# Patient Record
Sex: MALE | Race: WHITE | NOT HISPANIC OR LATINO | Employment: FULL TIME | URBAN - METROPOLITAN AREA
[De-identification: names, ages, dates, MRNs, and addresses within clinical notes are randomized per-mention and may not be internally consistent; named-entity substitution may affect disease eponyms.]

---

## 2023-06-02 LAB
ALANINE AMINOTRANSFERASE (SGPT) (U/L) IN SER/PLAS: 21 U/L (ref 10–52)
ALBUMIN (G/DL) IN SER/PLAS: 4.4 G/DL (ref 3.4–5)
ALKALINE PHOSPHATASE (U/L) IN SER/PLAS: 66 U/L (ref 33–120)
ANION GAP IN SER/PLAS: 10 MMOL/L (ref 10–20)
ASPARTATE AMINOTRANSFERASE (SGOT) (U/L) IN SER/PLAS: 17 U/L (ref 9–39)
BASOPHILS (10*3/UL) IN BLOOD BY AUTOMATED COUNT: 0.05 X10E9/L (ref 0–0.1)
BASOPHILS/100 LEUKOCYTES IN BLOOD BY AUTOMATED COUNT: 0.9 % (ref 0–2)
BILIRUBIN TOTAL (MG/DL) IN SER/PLAS: 0.7 MG/DL (ref 0–1.2)
C REACTIVE PROTEIN (MG/L) IN SER/PLAS: 0.32 MG/DL
CALCIDIOL (25 OH VITAMIN D3) (NG/ML) IN SER/PLAS: 58 NG/ML
CALCIUM (MG/DL) IN SER/PLAS: 9.3 MG/DL (ref 8.6–10.3)
CARBON DIOXIDE, TOTAL (MMOL/L) IN SER/PLAS: 27 MMOL/L (ref 21–32)
CHLORIDE (MMOL/L) IN SER/PLAS: 103 MMOL/L (ref 98–107)
CHOLESTEROL (MG/DL) IN SER/PLAS: 145 MG/DL (ref 0–199)
CHOLESTEROL IN HDL (MG/DL) IN SER/PLAS: 43.6 MG/DL
CHOLESTEROL/HDL RATIO: 3.3
COBALAMIN (VITAMIN B12) (PG/ML) IN SER/PLAS: 184 PG/ML (ref 211–911)
CREATINE KINASE (U/L) IN SER/PLAS: 160 U/L (ref 0–325)
CREATININE (MG/DL) IN SER/PLAS: 1.43 MG/DL (ref 0.5–1.3)
EOSINOPHILS (10*3/UL) IN BLOOD BY AUTOMATED COUNT: 0.42 X10E9/L (ref 0–0.7)
EOSINOPHILS/100 LEUKOCYTES IN BLOOD BY AUTOMATED COUNT: 7.6 % (ref 0–6)
ERYTHROCYTE DISTRIBUTION WIDTH (RATIO) BY AUTOMATED COUNT: 13.3 % (ref 11.5–14.5)
ERYTHROCYTE MEAN CORPUSCULAR HEMOGLOBIN CONCENTRATION (G/DL) BY AUTOMATED: 33.8 G/DL (ref 32–36)
ERYTHROCYTE MEAN CORPUSCULAR VOLUME (FL) BY AUTOMATED COUNT: 88 FL (ref 80–100)
ERYTHROCYTES (10*6/UL) IN BLOOD BY AUTOMATED COUNT: 5.22 X10E12/L (ref 4.5–5.9)
GFR MALE: 57 ML/MIN/1.73M2
GLUCOSE (MG/DL) IN SER/PLAS: 102 MG/DL (ref 74–99)
HEMATOCRIT (%) IN BLOOD BY AUTOMATED COUNT: 45.9 % (ref 41–52)
HEMOGLOBIN (G/DL) IN BLOOD: 15.5 G/DL (ref 13.5–17.5)
IMMATURE GRANULOCYTES/100 LEUKOCYTES IN BLOOD BY AUTOMATED COUNT: 0.4 % (ref 0–0.9)
LDL: 79 MG/DL (ref 0–99)
LEUKOCYTES (10*3/UL) IN BLOOD BY AUTOMATED COUNT: 5.5 X10E9/L (ref 4.4–11.3)
LYMPHOCYTES (10*3/UL) IN BLOOD BY AUTOMATED COUNT: 1.68 X10E9/L (ref 1.2–4.8)
LYMPHOCYTES/100 LEUKOCYTES IN BLOOD BY AUTOMATED COUNT: 30.5 % (ref 13–44)
MONOCYTES (10*3/UL) IN BLOOD BY AUTOMATED COUNT: 0.52 X10E9/L (ref 0.1–1)
MONOCYTES/100 LEUKOCYTES IN BLOOD BY AUTOMATED COUNT: 9.5 % (ref 2–10)
NEUTROPHILS (10*3/UL) IN BLOOD BY AUTOMATED COUNT: 2.81 X10E9/L (ref 1.2–7.7)
NEUTROPHILS/100 LEUKOCYTES IN BLOOD BY AUTOMATED COUNT: 51.1 % (ref 40–80)
PLATELETS (10*3/UL) IN BLOOD AUTOMATED COUNT: 199 X10E9/L (ref 150–450)
POTASSIUM (MMOL/L) IN SER/PLAS: 4.2 MMOL/L (ref 3.5–5.3)
PROTEIN TOTAL: 7 G/DL (ref 6.4–8.2)
SEDIMENTATION RATE, ERYTHROCYTE: 6 MM/H (ref 0–20)
SODIUM (MMOL/L) IN SER/PLAS: 136 MMOL/L (ref 136–145)
THYROTROPIN (MIU/L) IN SER/PLAS BY DETECTION LIMIT <= 0.05 MIU/L: 0.97 MIU/L (ref 0.44–3.98)
TRIGLYCERIDE (MG/DL) IN SER/PLAS: 113 MG/DL (ref 0–149)
URATE (MG/DL) IN SER/PLAS: 5.5 MG/DL (ref 4–7.5)
UREA NITROGEN (MG/DL) IN SER/PLAS: 18 MG/DL (ref 6–23)
VLDL: 23 MG/DL (ref 0–40)

## 2023-06-05 LAB
ANA PATTERN: ABNORMAL
ANA TITER: ABNORMAL
ANTI-CENTROMERE: <0.2 AI
ANTI-CHROMATIN: <0.2 AI
ANTI-DNA (DS): 24 IU/ML
ANTI-JO-1 IGG: <0.2 AI
ANTI-NUCLEAR ANTIBODY (ANA): POSITIVE
ANTI-RIBOSOMAL P: <0.2 AI
ANTI-RNP: <0.2 AI
ANTI-SCL-70: <0.2 AI
ANTI-SM/RNP: <0.2 AI
ANTI-SM: <0.2 AI
ANTI-SSA: <0.2 AI
ANTI-SSB: <0.2 AI

## 2023-06-06 LAB
ALBUMIN ELP: 4.3 G/DL (ref 3.4–5)
ALPHA 1: 0.3 G/DL (ref 0.2–0.6)
ALPHA 2: 0.7 G/DL (ref 0.4–1.1)
BETA: 0.7 G/DL (ref 0.5–1.2)
GAMMA GLOBULIN: 1 G/DL (ref 0.5–1.4)
PATH REVIEW - SERUM IMMUNOFIXATION: NORMAL
PATH REVIEW-SERUM PROTEIN ELECTROPHORESIS: NORMAL
PROTEIN ELECTROPHORESIS INTERPRETATION: NORMAL
PROTEIN TOTAL: 7 G/DL (ref 6.4–8.2)
SERUM IMMUNOFIXATION INTERPRETATION: NORMAL

## 2023-06-09 LAB
APPEARANCE, URINE: CLEAR
BILIRUBIN, URINE: NEGATIVE
BLOOD, URINE: NEGATIVE
COLOR, URINE: YELLOW
COMPLEMENT C3 (MG/DL) IN SER/PLAS: 127 MG/DL (ref 87–200)
COMPLEMENT C4 (MG/DL) IN SER/PLAS: 25 MG/DL (ref 10–50)
GLUCOSE, URINE: 100 MG/DL
KETONES, URINE: NEGATIVE MG/DL
LEUKOCYTE ESTERASE, URINE: NEGATIVE
NITRITE, URINE: NEGATIVE
PH, URINE: 5 (ref 5–8)
PROTEIN, URINE: NEGATIVE MG/DL
SPECIFIC GRAVITY, URINE: 1.02 (ref 1–1.03)
UROBILINOGEN, URINE: <2 MG/DL (ref 0–1.9)

## 2023-06-13 LAB — ANTI-HISTONE ANTIBODIES: 0.5 UNITS (ref 0–0.9)

## 2023-07-28 LAB
ALANINE AMINOTRANSFERASE (SGPT) (U/L) IN SER/PLAS: 21 U/L (ref 10–52)
ALBUMIN (G/DL) IN SER/PLAS: 4.2 G/DL (ref 3.4–5)
ALKALINE PHOSPHATASE (U/L) IN SER/PLAS: 62 U/L (ref 33–120)
ASPARTATE AMINOTRANSFERASE (SGOT) (U/L) IN SER/PLAS: 16 U/L (ref 9–39)
BILIRUBIN DIRECT (MG/DL) IN SER/PLAS: 0.1 MG/DL (ref 0–0.3)
BILIRUBIN TOTAL (MG/DL) IN SER/PLAS: 0.4 MG/DL (ref 0–1.2)
CREATINE KINASE (U/L) IN SER/PLAS: 185 U/L (ref 0–325)
CREATININE (MG/DL) IN SER/PLAS: 1.47 MG/DL (ref 0.5–1.3)
GFR MALE: 55 ML/MIN/1.73M2
PROTEIN TOTAL: 6.7 G/DL (ref 6.4–8.2)
UREA NITROGEN (MG/DL) IN SER/PLAS: 21 MG/DL (ref 6–23)

## 2023-07-29 LAB — ANTI-DNA (DS): 18 IU/ML

## 2023-07-31 LAB — ALDOLASE SERUM: 5.5 U/L (ref 1.2–7.6)

## 2023-10-09 PROBLEM — R06.83 SNORING: Status: ACTIVE | Noted: 2023-10-09

## 2023-10-09 PROBLEM — M72.2 PLANTAR FASCIAL FIBROMATOSIS OF LEFT FOOT: Status: ACTIVE | Noted: 2023-10-09

## 2023-10-09 PROBLEM — Z22.7 LATENT TUBERCULOSIS INFECTION: Status: ACTIVE | Noted: 2023-10-09

## 2023-10-09 PROBLEM — D22.9 NEVUS, ATYPICAL: Status: ACTIVE | Noted: 2023-10-09

## 2023-10-09 PROBLEM — H72.92 TYMPANIC MEMBRANE PERFORATION, LEFT: Status: ACTIVE | Noted: 2023-10-09

## 2023-10-09 PROBLEM — E78.5 HYPERLIPIDEMIA: Status: ACTIVE | Noted: 2023-10-09

## 2023-10-09 PROBLEM — H67.3: Status: ACTIVE | Noted: 2023-10-09

## 2023-10-09 PROBLEM — H66.93 BILATERAL CHRONIC OTITIS MEDIA: Status: ACTIVE | Noted: 2023-10-09

## 2023-10-09 PROBLEM — L02.211 ABDOMINAL WALL ABSCESS: Status: ACTIVE | Noted: 2023-10-09

## 2023-10-09 PROBLEM — K50.90: Status: ACTIVE | Noted: 2023-10-09

## 2023-10-09 PROBLEM — G62.9 PERIPHERAL NEUROPATHY: Status: ACTIVE | Noted: 2023-10-09

## 2023-10-09 PROBLEM — H72.93: Status: ACTIVE | Noted: 2023-10-09

## 2023-10-09 PROBLEM — E53.8 VITAMIN B12 DEFICIENCY: Status: ACTIVE | Noted: 2023-10-09

## 2023-10-09 PROBLEM — M79.671 MECHANICAL PAIN OF RIGHT FOOT: Status: ACTIVE | Noted: 2023-10-09

## 2023-10-09 PROBLEM — H90.A32 MIXED CONDUCTIVE AND SENSORINEURAL HEARING LOSS, UNILATERAL, LEFT EAR WITH RESTRICTED HEARING ON THE CONTRALATERAL SIDE: Status: ACTIVE | Noted: 2023-10-09

## 2023-10-09 PROBLEM — H93.8X3 SENSATION OF FULLNESS IN BOTH EARS: Status: ACTIVE | Noted: 2023-10-09

## 2023-10-09 PROBLEM — M10.9 GOUT: Status: ACTIVE | Noted: 2023-10-09

## 2023-10-09 PROBLEM — H90.6 MIXED CONDUCTIVE AND SENSORINEURAL HEARING LOSS OF BOTH EARS: Status: ACTIVE | Noted: 2023-10-09

## 2023-10-09 PROBLEM — E55.9 VITAMIN D DEFICIENCY: Status: ACTIVE | Noted: 2023-10-09

## 2023-10-09 PROBLEM — R53.83 FATIGUE: Status: ACTIVE | Noted: 2023-10-09

## 2023-10-09 PROBLEM — M19.079 OSTEOARTHRITIS OF FOOT JOINT: Status: ACTIVE | Noted: 2023-10-09

## 2023-10-09 PROBLEM — M79.10 MYALGIA: Status: ACTIVE | Noted: 2023-10-09

## 2023-10-09 PROBLEM — M72.2 PLANTAR FASCIITIS, LEFT: Status: ACTIVE | Noted: 2023-10-09

## 2023-10-09 PROBLEM — H90.5 SENSORINEURAL HEARING LOSS (SNHL) OF LEFT EAR: Status: ACTIVE | Noted: 2023-10-09

## 2023-10-09 PROBLEM — M07.60: Status: ACTIVE | Noted: 2023-10-09

## 2023-10-09 PROBLEM — K40.90 INGUINAL HERNIA: Status: ACTIVE | Noted: 2023-10-09

## 2023-10-09 PROBLEM — H93.19 TINNITUS, SUBJECTIVE: Status: ACTIVE | Noted: 2023-10-09

## 2023-10-09 PROBLEM — G47.30 SLEEP APNEA: Status: ACTIVE | Noted: 2023-10-09

## 2023-10-09 PROBLEM — R22.30 MASS OF SHOULDER REGION: Status: ACTIVE | Noted: 2023-10-09

## 2023-10-09 PROBLEM — S93.313A: Status: ACTIVE | Noted: 2023-10-09

## 2023-10-09 PROBLEM — L91.8 SKIN TAG, ACQUIRED: Status: ACTIVE | Noted: 2023-10-09

## 2023-10-09 PROBLEM — K58.9 IRRITABLE BOWEL SYNDROME: Status: ACTIVE | Noted: 2023-10-09

## 2023-10-09 PROBLEM — H66.3X1 CHRONIC SUPPURATIVE OTITIS MEDIA OF RIGHT EAR: Status: ACTIVE | Noted: 2023-10-09

## 2023-10-09 PROBLEM — N18.31 STAGE 3A CHRONIC KIDNEY DISEASE (MULTI): Status: ACTIVE | Noted: 2023-10-09

## 2023-10-09 PROBLEM — E66.9 OBESITY: Status: ACTIVE | Noted: 2023-10-09

## 2023-10-09 PROBLEM — R14.0 ABDOMINAL BLOATING: Status: ACTIVE | Noted: 2023-10-09

## 2023-10-09 PROBLEM — H90.A21 SENSORINEURAL HEARING LOSS (SNHL) OF RIGHT EAR WITH RESTRICTED HEARING OF LEFT EAR: Status: ACTIVE | Noted: 2023-10-09

## 2023-10-09 PROBLEM — H72.90: Status: ACTIVE | Noted: 2023-10-09

## 2023-10-09 PROBLEM — R73.9 HYPERGLYCEMIA: Status: ACTIVE | Noted: 2023-10-09

## 2023-10-09 PROBLEM — M76.72 PERONEAL TENDINITIS OF LEFT LOWER EXTREMITY: Status: ACTIVE | Noted: 2023-10-09

## 2023-10-09 PROBLEM — H90.0 CONDUCTIVE HEARING LOSS, BILATERAL: Status: ACTIVE | Noted: 2023-10-09

## 2023-10-09 PROBLEM — I10 HYPERTENSION: Status: ACTIVE | Noted: 2023-10-09

## 2023-10-09 PROBLEM — K50.10 CROHN'S DISEASE OF COLON (MULTI): Status: ACTIVE | Noted: 2023-10-09

## 2023-10-09 PROBLEM — H61.21 HEARING LOSS OF RIGHT EAR DUE TO CERUMEN IMPACTION: Status: ACTIVE | Noted: 2023-10-09

## 2023-10-09 PROBLEM — H70.13 CHRONIC MASTOIDITIS OF BOTH SIDES: Status: ACTIVE | Noted: 2023-10-09

## 2023-10-09 PROBLEM — F17.200 CURRENT EVERY DAY SMOKER: Status: ACTIVE | Noted: 2023-10-09

## 2023-10-09 PROBLEM — R76.8 POSITIVE ANA (ANTINUCLEAR ANTIBODY): Status: ACTIVE | Noted: 2023-10-09

## 2023-10-09 RX ORDER — ATORVASTATIN CALCIUM 20 MG/1
1 TABLET, FILM COATED ORAL NIGHTLY
COMMUNITY
Start: 2014-07-11 | End: 2023-11-17 | Stop reason: SINTOL

## 2023-10-09 RX ORDER — AMLODIPINE AND VALSARTAN 5; 160 MG/1; MG/1
1 TABLET ORAL DAILY
COMMUNITY
End: 2024-01-03 | Stop reason: ALTCHOICE

## 2023-10-09 RX ORDER — METOPROLOL SUCCINATE 100 MG/1
1 TABLET, EXTENDED RELEASE ORAL DAILY
COMMUNITY
Start: 2020-11-10 | End: 2024-01-03 | Stop reason: SDUPTHER

## 2023-10-09 RX ORDER — ERGOCALCIFEROL 1.25 MG/1
1 CAPSULE ORAL DAILY
COMMUNITY
Start: 2019-04-18

## 2023-10-09 RX ORDER — LANOLIN ALCOHOL/MO/W.PET/CERES
1 CREAM (GRAM) TOPICAL DAILY
COMMUNITY
End: 2024-01-31 | Stop reason: ALTCHOICE

## 2023-10-10 ENCOUNTER — OFFICE VISIT (OUTPATIENT)
Dept: PODIATRY | Facility: CLINIC | Age: 58
End: 2023-10-10
Payer: COMMERCIAL

## 2023-10-10 DIAGNOSIS — M72.2 PLANTAR FASCIITIS OF RIGHT FOOT: Primary | ICD-10-CM

## 2023-10-10 PROCEDURE — 20550 NJX 1 TENDON SHEATH/LIGAMENT: CPT | Performed by: PODIATRIST

## 2023-10-10 PROCEDURE — 99213 OFFICE O/P EST LOW 20 MIN: CPT | Performed by: PODIATRIST

## 2023-10-10 RX ORDER — LIDOCAINE HYDROCHLORIDE 10 MG/ML
2 INJECTION INFILTRATION; PERINEURAL
Status: COMPLETED | OUTPATIENT
Start: 2023-10-10 | End: 2023-10-10

## 2023-10-10 RX ORDER — TRIAMCINOLONE ACETONIDE 40 MG/ML
10 INJECTION, SUSPENSION INTRA-ARTICULAR; INTRAMUSCULAR
Status: COMPLETED | OUTPATIENT
Start: 2023-10-10 | End: 2023-10-10

## 2023-10-10 RX ADMIN — LIDOCAINE HYDROCHLORIDE 2 ML: 10 INJECTION INFILTRATION; PERINEURAL at 14:06

## 2023-10-10 RX ADMIN — TRIAMCINOLONE ACETONIDE 10 MG: 40 INJECTION, SUSPENSION INTRA-ARTICULAR; INTRAMUSCULAR at 14:06

## 2023-10-10 NOTE — PATIENT INSTRUCTIONS
We discussed the option of surgical repair.  Patient is to call me if he would like to proceed with surgery.

## 2023-10-10 NOTE — PROGRESS NOTES
CC: right heel pain    HPI:  This 58 y.o. male presents complaining of pain in the right heel x 3 months.  Had a shot 3 moths ago.Denies trauma. Onset was gradual with worsening course until recently.  Denies wt gain of > 10lbs in the last month and denies starting a new exercise program. Patient endorses burning, numbness or tingling. Pain is severe  and rated as 10 /10 after getting up in AM with no improvement after prolonged WB.  Denies any swelling or redness.  Patient is not using inserts.  And has  been taking analgesics with some relief.  Denies any other pedal complaints.    PMH  Past Medical History:   Diagnosis Date    Crohn's disease, unspecified, without complications (CMS/[facility]) 08/24/2020    Crohn's disease    Hemorrhage of anus and rectum     Hemorrhage of rectum and anus    Hyperlipidemia, unspecified     Dyslipidemia    Otitis media, unspecified, right ear 01/04/2018    Acute otitis media, right    Personal history of other diseases of the circulatory system     History of hypertension    Personal history of other diseases of the nervous system and sense organs     History of sleep apnea    Personal history of other diseases of the respiratory system 12/08/2018    History of upper respiratory infection    Personal history of other diseases of the respiratory system     History of bronchitis    Rash and other nonspecific skin eruption     Rash    Unspecified abdominal pain 08/24/2020    Abdominal cramping     MEDS    Current Outpatient Medications:     amlodipine-valsartan (Exforge) 5-160 mg tablet, Take 1 tablet by mouth once daily., Disp: , Rfl:     cyanocobalamin (Vitamin B-12) 1,000 mcg tablet, Take 1 tablet (1,000 mcg) by mouth once daily., Disp: , Rfl:     ergocalciferol (Vitamin D-2) 1.25 MG (39175 UT) capsule, Take 1 capsule (1,250 mcg) by mouth once daily., Disp: , Rfl:     metoprolol succinate XL (Toprol-XL) 100 mg 24 hr tablet, Take 1 tablet (100 mg) by mouth once daily., Disp: , Rfl:      Stelara injection, INJECT 90 MG SUBCUTANEOUSLY EVERY 8 WEEKS AS DIRECTED, Disp: 2 mL, Rfl: 2    atorvastatin (Lipitor) 20 mg tablet, Take 1 tablet (20 mg) by mouth once daily at bedtime., Disp: , Rfl:   Allergies  Allergies   Allergen Reactions    Atorvastatin Myalgia     Joint pain and fatigue    Cephalexin Hives     Social History     Socioeconomic History    Marital status:      Spouse name: Not on file    Number of children: Not on file    Years of education: Not on file    Highest education level: Not on file   Occupational History    Not on file   Tobacco Use    Smoking status: Every Day     Types: Cigarettes    Smokeless tobacco: Current   Substance and Sexual Activity    Alcohol use: Not on file    Drug use: Not on file    Sexual activity: Not on file   Other Topics Concern    Not on file   Social History Narrative    Not on file     Social Determinants of Health     Financial Resource Strain: Not on file   Food Insecurity: Not on file   Transportation Needs: Not on file   Physical Activity: Not on file   Stress: Not on file   Social Connections: Not on file   Intimate Partner Violence: Not on file   Housing Stability: Not on file     Family History   Problem Relation Name Age of Onset    Stroke Mother      Hypertension Mother      Breast cancer Other Grandmother     Uterine cancer Other Grandmother      Past Surgical History:   Procedure Laterality Date    COLONOSCOPY  08/24/2020    Complete Colonoscopy    HERNIA REPAIR  08/24/2020    Hernia Repair    OTHER SURGICAL HISTORY  08/24/2020    Carpal tunnel surgery    OTHER SURGICAL HISTORY  08/24/2020    Tonsillectomy    OTHER SURGICAL HISTORY  08/24/2020    Adenoidectomy    OTHER SURGICAL HISTORY  08/25/2022    Colon surgery    OTHER SURGICAL HISTORY  08/25/2022    Colonoscopy    OTHER SURGICAL HISTORY  08/25/2022    Hernia repair    OTHER SURGICAL HISTORY  08/25/2022    Small bowel resection    SMALL INTESTINE SURGERY  08/24/2020    Small Bowel  Resection       REVIEW OF SYSTEMS  MSK: + as noted in HPI.      Physical examination:   On General Observation: Patient is a pleasant, cooperative, well developed 58 y.o.  adult male. The patient is alert and oriented to time, place and person. Patient has normal affect and mood.  There were no vitals taken for this visit.    Vascular:  DP and PT pulses are palpable.  CFT less than 3 seconds to all digits bilateral.  Skin temperature is warm to warm from proximal to distal bilateral.  Hair growth is noted.      Neuro:  Light touch intact bilateral. Protective sensation intact at all pedal sites via [place] 5.07 monofilament bilateral.  Proprioception intact at the hallux bilateral.  No clonus noted.  Babinski reflex not elicited bilateral.    Derm:  Skin texture and turgor within normal limits.  Toenails normal in appearance.  Webspaces 1-4 clean, dry, intact bilateral.  No rashes, subcutaneous nodules, or open lesions noted.  No hyperkeratotic tissue. no erythema    Muscle strength is 5/5 for all instrinsic and extrinsic muscle groups.   General foot morphology:  ROM of the STJ and MTJ is jamie without pain or crepitus. Pain with palpation to the plantar medial aspect of the right heel at the insertion site of the central band of the plantar fascia.  Positive erythema and effusion to the plantar right  heel.  Negative Tinel's sign with percussion of the posterior tibial nerve at the proximal and distal tarsal tunnel regions.  No pain with palpation/compression to posterior calcaneal tuber.  No signs of calcaneal stress fracture.  No signs of Brumfield's nerve entrapment.  Negative straight leg raise test.  No evidence of nodularity or fibromatosis.        ASSESSMENT:  This is a 58 y.o. patient presenting with right plantar fasciitis     PLAN:   A comprehensive history and physical examination were preformed. The patient was educated on clinical and radiographic findings, diagnosis and treatment plans. Patient  state that he understands all that has been explained and all questions were answered to his apparent satisfaction.     -We discussed the etiology of the heel complaints as well as the plantar fasciitis treatment protocol.  -Pt to to begin stretching, icing,  and wearing appropriate shoes.  No barefoot walking.  -Demonstrated stretching exercises to the pt - dispensed literature.   -Recommended using OTC arch supports, discussed details with patient  -Advised patient on use of anti-inflammatory medications .   -Consider surgery if pain is not relieved by injection.  Patient ID: Colby Cordero III is a 58 y.o. male.    S Inj/Asp on 10/10/2023 2:06 PM  Indications: pain  Details: 25 G needle, plantar approach  Medications: 10 mg triamcinolone acetonide 40 mg/mL; 2 mL lidocaine 10 mg/mL (1 %)  Outcome: tolerated well, no immediate complications  Procedure, treatment alternatives, risks and benefits explained, specific risks discussed. Immediately prior to procedure a time out was called to verify the correct patient, procedure, equipment, support staff and site/side marked as required.           Libia Arroyo, CHELY  [address] [place] [address]

## 2023-10-20 ENCOUNTER — PREP FOR PROCEDURE (OUTPATIENT)
Dept: PODIATRY | Facility: CLINIC | Age: 58
End: 2023-10-20
Payer: COMMERCIAL

## 2023-10-20 DIAGNOSIS — M72.2 PLANTAR FASCIITIS OF RIGHT FOOT: Primary | ICD-10-CM

## 2023-11-17 ENCOUNTER — PRE-ADMISSION TESTING (OUTPATIENT)
Dept: PREADMISSION TESTING | Facility: HOSPITAL | Age: 58
End: 2023-11-17
Payer: COMMERCIAL

## 2023-11-17 ENCOUNTER — LAB (OUTPATIENT)
Dept: LAB | Facility: LAB | Age: 58
End: 2023-11-17
Payer: COMMERCIAL

## 2023-11-17 VITALS
DIASTOLIC BLOOD PRESSURE: 70 MMHG | HEART RATE: 60 BPM | SYSTOLIC BLOOD PRESSURE: 141 MMHG | RESPIRATION RATE: 12 BRPM | HEIGHT: 72 IN | WEIGHT: 265.65 LBS | TEMPERATURE: 96.6 F | BODY MASS INDEX: 35.98 KG/M2 | OXYGEN SATURATION: 97 %

## 2023-11-17 DIAGNOSIS — M72.2 PLANTAR FASCIITIS OF RIGHT FOOT: ICD-10-CM

## 2023-11-17 DIAGNOSIS — Z01.818 PRE-OP TESTING: ICD-10-CM

## 2023-11-17 DIAGNOSIS — Z01.818 PRE-OP TESTING: Primary | ICD-10-CM

## 2023-11-17 LAB
ANION GAP SERPL CALC-SCNC: 12 MMOL/L (ref 10–20)
BUN SERPL-MCNC: 15 MG/DL (ref 6–23)
CALCIUM SERPL-MCNC: 9.6 MG/DL (ref 8.6–10.3)
CHLORIDE SERPL-SCNC: 106 MMOL/L (ref 98–107)
CO2 SERPL-SCNC: 25 MMOL/L (ref 21–32)
CREAT SERPL-MCNC: 1.51 MG/DL (ref 0.5–1.3)
GFR SERPL CREATININE-BSD FRML MDRD: 53 ML/MIN/1.73M*2
GLUCOSE SERPL-MCNC: 83 MG/DL (ref 74–99)
POTASSIUM SERPL-SCNC: 4.1 MMOL/L (ref 3.5–5.3)
SODIUM SERPL-SCNC: 139 MMOL/L (ref 136–145)

## 2023-11-17 PROCEDURE — 80048 BASIC METABOLIC PNL TOTAL CA: CPT

## 2023-11-17 PROCEDURE — 36415 COLL VENOUS BLD VENIPUNCTURE: CPT

## 2023-11-17 PROCEDURE — 99202 OFFICE O/P NEW SF 15 MIN: CPT | Performed by: NURSE PRACTITIONER

## 2023-11-17 ASSESSMENT — DUKE ACTIVITY SCORE INDEX (DASI)
CAN YOU TAKE CARE OF YOURSELF (EAT, DRESS, BATHE, OR USE TOILET): YES
CAN YOU DO HEAVY WORK AROUND THE HOUSE LIKE SCRUBBING FLOORS OR LIFTING AND MOVING HEAVY FURNITURE: YES
CAN YOU DO MODERATE WORK AROUND THE HOUSE LIKE VACUUMING, SWEEPING FLOORS OR CARRYING GROCERIES: YES
DASI METS SCORE: 8.2
CAN YOU WALK INDOORS, SUCH AS AROUND YOUR HOUSE: YES
CAN YOU RUN A SHORT DISTANCE: YES
CAN YOU PARTICIPATE IN MODERATE RECREATIONAL ACTIVITIES LIKE GOLF, BOWLING, DANCING, DOUBLES TENNIS OR THROWING A BASEBALL OR FOOTBALL: NO
TOTAL_SCORE: 44.7
CAN YOU DO LIGHT WORK AROUND THE HOUSE LIKE DUSTING OR WASHING DISHES: YES
CAN YOU DO YARD WORK LIKE RAKING LEAVES, WEEDING OR PUSHING A MOWER: YES
CAN YOU PARTICIPATE IN STRENOUS SPORTS LIKE SWIMMING, SINGLES TENNIS, FOOTBALL, BASKETBALL, OR SKIING: NO
CAN YOU WALK A BLOCK OR TWO ON LEVEL GROUND: YES
CAN YOU CLIMB A FLIGHT OF STAIRS OR WALK UP A HILL: YES
CAN YOU HAVE SEXUAL RELATIONS: YES

## 2023-11-17 ASSESSMENT — CHADS2 SCORE
HYPERTENSION: YES
CHF: NO
CHADS2 SCORE: 1
PRIOR STROKE OR TIA OR THROMBOEMBOLISM: NO
DIABETES: NO
AGE GREATER THAN OR EQUAL TO 75: NO

## 2023-11-17 ASSESSMENT — PAIN SCALES - GENERAL: PAINLEVEL_OUTOF10: 0 - NO PAIN

## 2023-11-17 ASSESSMENT — LIFESTYLE VARIABLES: SMOKING_STATUS: SMOKER

## 2023-11-17 ASSESSMENT — PAIN - FUNCTIONAL ASSESSMENT: PAIN_FUNCTIONAL_ASSESSMENT: 0-10

## 2023-11-17 ASSESSMENT — ACTIVITIES OF DAILY LIVING (ADL): ADL_SCORE: 0

## 2023-11-17 NOTE — PREPROCEDURE INSTRUCTIONS
Medication List            Accurate as of November 17, 2023  2:35 PM. Always use your most recent med list.                amlodipine-valsartan 5-160 mg tablet  Commonly known as: Exforge  Medication Adjustments for Surgery: Stop 1 day before surgery     cyanocobalamin 1,000 mcg tablet  Commonly known as: Vitamin B-12  Medication Adjustments for Surgery: Stop 7 days before surgery     ergocalciferol 1.25 MG (04216 UT) capsule  Commonly known as: Vitamin D-2  Medication Adjustments for Surgery: Stop 7 days before surgery     metoprolol succinate  mg 24 hr tablet  Commonly known as: Toprol-XL  Medication Adjustments for Surgery: Take morning of surgery with sip of water, no other fluids     Stelara injection  Generic drug: ustekinumab  INJECT 90 MG SUBCUTANEOUSLY EVERY 8 WEEKS AS DIRECTED  Notes to patient: Coordinate with prescribing provider for further instructions on this medications prior to surgery.             PRE-OPERATIVE INSTRUCTIONS    You will receive notification one business day prior to your surgery to confirm your arrival time and additional information. It is important that you answer your phone and/or check your messages during this time.    Please enter the building through the Outpatient entrance. Take the elevator off the lobby to the 2nd floor and check in at the Outpatient Surgery desk    INSTRUCTIONS:  Talk to your surgeon for instructions if you should stop your aspirin, blood thinner, or diabetes medicines.  DO NOT take any multivitamins or over the counter supplements for 7-10 days before surgery.  If not being admitted, you must have an adult immediately available to drive you home after surgery. We also highly recommend you have someone stay with you for the entire day and night of your surgery.  For children having surgery, a parent or legal guardian must accompany t hem to the surgery center. If this is not possible, please call [phone] to make additional arrangements.  For  adults who are unable to consent or make medical decisions for themselves, a legal guardian or Power of  must accompany them to the surgery center. If this is not possible, please call [phone] to make additional arrangements.  Wear comfortable, loose fitting clothing.  All jewelry and piercings must be removed. If you are unable to remove an item or have a dermal piercing, please be sure to tell the nurse when you arrive for surgery.  Nail polish and make-up must be removed.  Avoid smoking or consuming alcohol for 24 hours before surgery.  To help prevent infection, please take a shower/bath and wash your hair the night before and/or morning of surgery.    Additional instructions about eating and drinking before surgery:  Do not eat any solid foods or drink anything but clear liquids within 6 hours of your arrival time for surgery. Milk, nutritional drinks/supplements, and infant formula are considered solid foods.  You may drink up to 12 oz. of clear liquids up to 2 hours before your arrival time for surgery, unless directed otherwise by your surgeon. Clear liquids include water, non-carbonated sports drinks (Gatorade), black tea or coffee (no creamers) and breast milk.    If you have any questions or concerns, please call Pre-Admission Testing at [phone].      Preoperative Bathing instructions    Follow these instructions the evening before and morning of surgery:  Do not shave the day before or day of surgery.  Remove all jewelry until after surgery. Take off rings and take out all body-piercing jewelry.  Use a clean wash cloth and towel.  Wash your face and hair with your normal soap and shampoo before you use the CHG soap.  Use a wash cloth to clean your skin with the CHG soap. Use enough CHG soap to cover the skin on your whole body. Use the same amount as you would with your normal soap.  Do not use the CHG soap on your face, eyes, ears, or head.  Do not scrub your skin too hard.  Be sure  to clean the area well where surgery will be done.  Do not wash with your normal soap after the CHG soap.  Keep away from the water stream when you put CHG soap on. This keeps it from rinsing off too soon.  Rinse your body well.  Pat yourself dry with a clean, soft towel.  Put on clean clothing.  Do not put on any deodorants, lotions, or oils after showering. These might block how the CHG soap works.

## 2023-11-17 NOTE — CPM/PAT H&P
"CPM/PAT Evaluation       Name: Colby Cordero III (Colby Cordero III)  /Age: 1965/58 y.o.     In-Person       Chief Complaint: right foot pain    HPI    CG is a 59 yo male who had history of left foot plantar fasciitis and now presents to PAT with right foot fascitis with scheduled upcoming right foot fasciectomy.  He endorses right foot \"burning pains like if jumping on gravel without shoes on\". Has had recent steroid injections with little relief- last one was one month ago. Skin intact. Otherwise denies any recent illness, fever/chills, chest pains or shortness of breath.      Past Medical History:   Diagnosis Date    CKD (chronic kidney disease)     stage 3    Crohn's disease, unspecified, without complications (CMS/[facility]) 2020    Crohn's disease    Hemorrhage of anus and rectum     Hemorrhage of rectum and anus    Hyperlipidemia, unspecified     Dyslipidemia    Hypertension     Otitis media, unspecified, right ear 2018    Acute otitis media, right    Personal history of other diseases of the circulatory system     History of hypertension    Personal history of other diseases of the nervous system and sense organs     History of sleep apnea    Personal history of other diseases of the respiratory system 2018    History of upper respiratory infection    Personal history of other diseases of the respiratory system     History of bronchitis    Rash and other nonspecific skin eruption     Rash    Sleep apnea     Unspecified abdominal pain 2020    Abdominal cramping     PCP: Wilner  Rheum: Dr. Freeman  GI: Dr. Muniz    Past Surgical History:   Procedure Laterality Date    COLONOSCOPY  2020    Complete Colonoscopy    HERNIA REPAIR  2020    Hernia Repair    OTHER SURGICAL HISTORY  2020    Carpal tunnel surgery    OTHER SURGICAL HISTORY  2020    Tonsillectomy    OTHER SURGICAL HISTORY  2020    Adenoidectomy    OTHER SURGICAL HISTORY  2022    Colon " surgery    OTHER SURGICAL HISTORY  08/25/2022    Colonoscopy    OTHER SURGICAL HISTORY  08/25/2022    Hernia repair    OTHER SURGICAL HISTORY  08/25/2022    Small bowel resection    SMALL INTESTINE SURGERY  08/24/2020    Small Bowel Resection       Patient  reports being sexually active.    Family History   Problem Relation Name Age of Onset    Stroke Mother      Hypertension Mother      Heart disease Father      Cancer Brother      Breast cancer Other Grandmother     Uterine cancer Other Grandmother        Allergies   Allergen Reactions    Cephalexin Hives    Atorvastatin Myalgia     Joint pain and fatigue       Prior to Admission medications    Medication Sig Start Date End Date Taking? Authorizing Provider   amlodipine-valsartan (Exforge) 5-160 mg tablet Take 1 tablet by mouth once daily.    Historical Provider, MD   cyanocobalamin (Vitamin B-12) 1,000 mcg tablet Take 1 tablet (1,000 mcg) by mouth once daily.    Historical Provider, MD   ergocalciferol (Vitamin D-2) 1.25 MG (28214 UT) capsule Take 1 capsule (1,250 mcg) by mouth once daily. 4/18/19   Historical Provider, MD   metoprolol succinate XL (Toprol-XL) 100 mg 24 hr tablet Take 1 tablet (100 mg) by mouth once daily. 11/10/20   Historical Provider, MD   Stelara injection INJECT 90 MG SUBCUTANEOUSLY EVERY 8 WEEKS AS DIRECTED 8/29/23 2/29/24  Judy MONDRAGON MD   atorvastatin (Lipitor) 20 mg tablet Take 1 tablet (20 mg) by mouth once daily at bedtime. 7/11/14 11/17/23  Historical Provider, MD GALEN MYLES   Constitutional: Negative for fever, chills, or sweats   ENMT: Negative for nasal discharge, congestion, ear pain, mouth pain, throat pain   Respiratory: Negative for cough, wheezing, shortness of breath   Cardiac: Negative for chest pain, dyspnea on exertion, palpitations   Gastrointestinal: Negative for nausea, vomiting, diarrhea, constipation, abdominal pain  Genitourinary: Negative for dysuria, flank pain, frequency, hematuria   Musculoskeletal:  "Negative for decreased ROM, welling, weakness   Positive for right foot pains \"sharp\" and limited ROM  Neurological: Negative for dizziness, confusion, headache  Psychiatric: Negative for mood changes   Skin: Negative for itching, rash, ulcer    Hematologic/Lymph: Negative for bruising, easy bleeding  Allergic/Immunologic: Negative itching, sneezing, swelling     Physical Exam  HENT:      Head: Normocephalic.      Mouth/Throat:      Mouth: Mucous membranes are moist.   Eyes:      Extraocular Movements: Extraocular movements intact.   Cardiovascular:      Rate and Rhythm: Normal rate and regular rhythm.   Pulmonary:      Effort: Pulmonary effort is normal.      Breath sounds: Normal breath sounds.   Abdominal:      General: Abdomen is flat.      Palpations: Abdomen is soft.   Musculoskeletal:         General: Normal range of motion.      Cervical back: Normal range of motion.   Skin:     General: Skin is warm and dry.   Neurological:      General: No focal deficit present.      Mental Status: He is alert.   Psychiatric:         Mood and Affect: Mood normal.      PAT AIRWAY:    Chipped teeth  normal      Anesthesia:  Patient denies any anesthesia complications.      Visit Vitals  /70   Pulse 60   Temp 35.9 °C (96.6 °F) (Temporal)   Resp 12       DASI Risk Score    No data to display       Caprini DVT Assessment    No data to display       Modified Frailty Index    No data to display       CHADS2 Stroke Risk  Current as of just now        N/A 3 - 100%: High Risk   2 - 3%: Medium Risk   0 - 2%: Low Risk     Last Change: N/A          This score determines the patient's risk of having a stroke if the patient has atrial fibrillation.        This score is not applicable to this patient. Components are not calculated.          Revised Cardiac Risk Index    No data to display       Apfel Simplified Score    No data to display       Risk Analysis Index Results This Encounter    No data found in the last 1 encounters.   "       Assessment and Plan:     57 yo male scheduled for right foot fasciectomy on 12/5/2023 with Dr. Blackwell. BMP ordered. EKG shows sinus bradycardia, v. Rate of 56 bpm. Otherwise no further orders indicated.      See risk scores as previously documented.

## 2023-12-05 ENCOUNTER — HOSPITAL ENCOUNTER (OUTPATIENT)
Facility: HOSPITAL | Age: 58
Setting detail: OUTPATIENT SURGERY
Discharge: HOME | End: 2023-12-05
Attending: PODIATRIST | Admitting: PODIATRIST
Payer: COMMERCIAL

## 2023-12-05 ENCOUNTER — ANESTHESIA (OUTPATIENT)
Dept: OPERATING ROOM | Facility: HOSPITAL | Age: 58
End: 2023-12-05
Payer: COMMERCIAL

## 2023-12-05 ENCOUNTER — ANESTHESIA EVENT (OUTPATIENT)
Dept: OPERATING ROOM | Facility: HOSPITAL | Age: 58
End: 2023-12-05
Payer: COMMERCIAL

## 2023-12-05 VITALS
RESPIRATION RATE: 18 BRPM | OXYGEN SATURATION: 97 % | HEART RATE: 50 BPM | SYSTOLIC BLOOD PRESSURE: 125 MMHG | BODY MASS INDEX: 36.16 KG/M2 | HEIGHT: 72 IN | TEMPERATURE: 97.3 F | WEIGHT: 267 LBS | DIASTOLIC BLOOD PRESSURE: 80 MMHG

## 2023-12-05 DIAGNOSIS — M72.2 PLANTAR FASCIITIS OF RIGHT FOOT: Primary | ICD-10-CM

## 2023-12-05 PROBLEM — G47.33 OSA (OBSTRUCTIVE SLEEP APNEA): Status: ACTIVE | Noted: 2023-10-09

## 2023-12-05 PROCEDURE — 7100000002 HC RECOVERY ROOM TIME - EACH INCREMENTAL 1 MINUTE: Performed by: PODIATRIST

## 2023-12-05 PROCEDURE — 2500000004 HC RX 250 GENERAL PHARMACY W/ HCPCS (ALT 636 FOR OP/ED): Performed by: PODIATRIST

## 2023-12-05 PROCEDURE — 7100000010 HC PHASE TWO TIME - EACH INCREMENTAL 1 MINUTE: Performed by: PODIATRIST

## 2023-12-05 PROCEDURE — 2500000004 HC RX 250 GENERAL PHARMACY W/ HCPCS (ALT 636 FOR OP/ED): Performed by: STUDENT IN AN ORGANIZED HEALTH CARE EDUCATION/TRAINING PROGRAM

## 2023-12-05 PROCEDURE — 28060 PARTIAL REMOVAL FOOT FASCIA: CPT | Performed by: PODIATRIST

## 2023-12-05 PROCEDURE — 7100000009 HC PHASE TWO TIME - INITIAL BASE CHARGE: Performed by: PODIATRIST

## 2023-12-05 PROCEDURE — 2500000004 HC RX 250 GENERAL PHARMACY W/ HCPCS (ALT 636 FOR OP/ED): Performed by: ANESTHESIOLOGIST ASSISTANT

## 2023-12-05 PROCEDURE — 3600000002 HC OR TIME - INITIAL BASE CHARGE - PROCEDURE LEVEL TWO: Performed by: PODIATRIST

## 2023-12-05 PROCEDURE — A28060 PR PART EXCIS PLANTAR FASCIA: Performed by: STUDENT IN AN ORGANIZED HEALTH CARE EDUCATION/TRAINING PROGRAM

## 2023-12-05 PROCEDURE — 7100000001 HC RECOVERY ROOM TIME - INITIAL BASE CHARGE: Performed by: PODIATRIST

## 2023-12-05 PROCEDURE — 2500000001 HC RX 250 WO HCPCS SELF ADMINISTERED DRUGS (ALT 637 FOR MEDICARE OP): Performed by: STUDENT IN AN ORGANIZED HEALTH CARE EDUCATION/TRAINING PROGRAM

## 2023-12-05 PROCEDURE — 3600000007 HC OR TIME - EACH INCREMENTAL 1 MINUTE - PROCEDURE LEVEL TWO: Performed by: PODIATRIST

## 2023-12-05 PROCEDURE — 3700000001 HC GENERAL ANESTHESIA TIME - INITIAL BASE CHARGE: Performed by: PODIATRIST

## 2023-12-05 PROCEDURE — A28060 PR PART EXCIS PLANTAR FASCIA: Performed by: ANESTHESIOLOGIST ASSISTANT

## 2023-12-05 PROCEDURE — 2720000007 HC OR 272 NO HCPCS: Performed by: PODIATRIST

## 2023-12-05 PROCEDURE — 3700000002 HC GENERAL ANESTHESIA TIME - EACH INCREMENTAL 1 MINUTE: Performed by: PODIATRIST

## 2023-12-05 PROCEDURE — 2500000005 HC RX 250 GENERAL PHARMACY W/O HCPCS: Performed by: PODIATRIST

## 2023-12-05 RX ORDER — HYDROMORPHONE HYDROCHLORIDE 1 MG/ML
0.5 INJECTION, SOLUTION INTRAMUSCULAR; INTRAVENOUS; SUBCUTANEOUS EVERY 5 MIN PRN
Status: DISCONTINUED | OUTPATIENT
Start: 2023-12-05 | End: 2023-12-06 | Stop reason: HOSPADM

## 2023-12-05 RX ORDER — OXYCODONE AND ACETAMINOPHEN 5; 325 MG/1; MG/1
1 TABLET ORAL EVERY 6 HOURS PRN
Qty: 5 TABLET | Refills: 0 | Status: SHIPPED | OUTPATIENT
Start: 2023-12-05 | End: 2024-01-03 | Stop reason: WASHOUT

## 2023-12-05 RX ORDER — CLINDAMYCIN PHOSPHATE 600 MG/50ML
600 INJECTION, SOLUTION INTRAVENOUS ONCE
Status: COMPLETED | OUTPATIENT
Start: 2023-12-05 | End: 2023-12-05

## 2023-12-05 RX ORDER — OXYCODONE HYDROCHLORIDE 5 MG/1
5 TABLET ORAL EVERY 4 HOURS PRN
Status: DISCONTINUED | OUTPATIENT
Start: 2023-12-05 | End: 2023-12-06 | Stop reason: HOSPADM

## 2023-12-05 RX ORDER — FENTANYL CITRATE 50 UG/ML
25 INJECTION, SOLUTION INTRAMUSCULAR; INTRAVENOUS EVERY 5 MIN PRN
Status: DISCONTINUED | OUTPATIENT
Start: 2023-12-05 | End: 2023-12-06 | Stop reason: HOSPADM

## 2023-12-05 RX ORDER — PROPOFOL 10 MG/ML
INJECTION, EMULSION INTRAVENOUS CONTINUOUS PRN
Status: DISCONTINUED | OUTPATIENT
Start: 2023-12-05 | End: 2023-12-05

## 2023-12-05 RX ORDER — MEPERIDINE HYDROCHLORIDE 50 MG/ML
12.5 INJECTION INTRAMUSCULAR; INTRAVENOUS; SUBCUTANEOUS EVERY 10 MIN PRN
Status: DISCONTINUED | OUTPATIENT
Start: 2023-12-05 | End: 2023-12-06 | Stop reason: HOSPADM

## 2023-12-05 RX ORDER — MIDAZOLAM HYDROCHLORIDE 1 MG/ML
INJECTION, SOLUTION INTRAMUSCULAR; INTRAVENOUS AS NEEDED
Status: DISCONTINUED | OUTPATIENT
Start: 2023-12-05 | End: 2023-12-05

## 2023-12-05 RX ORDER — FENTANYL CITRATE 50 UG/ML
INJECTION, SOLUTION INTRAMUSCULAR; INTRAVENOUS AS NEEDED
Status: DISCONTINUED | OUTPATIENT
Start: 2023-12-05 | End: 2023-12-05

## 2023-12-05 RX ORDER — ALBUTEROL SULFATE 0.83 MG/ML
2.5 SOLUTION RESPIRATORY (INHALATION) ONCE AS NEEDED
Status: DISCONTINUED | OUTPATIENT
Start: 2023-12-05 | End: 2023-12-06 | Stop reason: HOSPADM

## 2023-12-05 RX ORDER — PROPOFOL 10 MG/ML
INJECTION, EMULSION INTRAVENOUS AS NEEDED
Status: DISCONTINUED | OUTPATIENT
Start: 2023-12-05 | End: 2023-12-05

## 2023-12-05 RX ORDER — BUPIVACAINE HYDROCHLORIDE 5 MG/ML
INJECTION, SOLUTION PERINEURAL AS NEEDED
Status: DISCONTINUED | OUTPATIENT
Start: 2023-12-05 | End: 2023-12-05 | Stop reason: HOSPADM

## 2023-12-05 RX ORDER — METOCLOPRAMIDE HYDROCHLORIDE 5 MG/ML
10 INJECTION INTRAMUSCULAR; INTRAVENOUS ONCE AS NEEDED
Status: DISCONTINUED | OUTPATIENT
Start: 2023-12-05 | End: 2023-12-06 | Stop reason: HOSPADM

## 2023-12-05 RX ORDER — LIDOCAINE HYDROCHLORIDE 10 MG/ML
0.1 INJECTION, SOLUTION EPIDURAL; INFILTRATION; INTRACAUDAL; PERINEURAL ONCE
Status: DISCONTINUED | OUTPATIENT
Start: 2023-12-05 | End: 2023-12-06 | Stop reason: HOSPADM

## 2023-12-05 RX ORDER — SODIUM CHLORIDE, SODIUM LACTATE, POTASSIUM CHLORIDE, CALCIUM CHLORIDE 600; 310; 30; 20 MG/100ML; MG/100ML; MG/100ML; MG/100ML
100 INJECTION, SOLUTION INTRAVENOUS CONTINUOUS
Status: DISCONTINUED | OUTPATIENT
Start: 2023-12-05 | End: 2023-12-06 | Stop reason: HOSPADM

## 2023-12-05 RX ORDER — LIDOCAINE HYDROCHLORIDE 20 MG/ML
INJECTION, SOLUTION INFILTRATION; PERINEURAL AS NEEDED
Status: DISCONTINUED | OUTPATIENT
Start: 2023-12-05 | End: 2023-12-05 | Stop reason: HOSPADM

## 2023-12-05 RX ORDER — LABETALOL HYDROCHLORIDE 5 MG/ML
5 INJECTION, SOLUTION INTRAVENOUS ONCE AS NEEDED
Status: DISCONTINUED | OUTPATIENT
Start: 2023-12-05 | End: 2023-12-06 | Stop reason: HOSPADM

## 2023-12-05 RX ADMIN — HYDROMORPHONE HYDROCHLORIDE 0.5 MG: 1 INJECTION, SOLUTION INTRAMUSCULAR; INTRAVENOUS; SUBCUTANEOUS at 10:10

## 2023-12-05 RX ADMIN — FENTANYL CITRATE 25 MCG: 50 INJECTION, SOLUTION INTRAMUSCULAR; INTRAVENOUS at 09:17

## 2023-12-05 RX ADMIN — FENTANYL CITRATE 25 MCG: 50 INJECTION, SOLUTION INTRAMUSCULAR; INTRAVENOUS at 09:19

## 2023-12-05 RX ADMIN — PROPOFOL 50 MG: 10 INJECTION, EMULSION INTRAVENOUS at 09:20

## 2023-12-05 RX ADMIN — FENTANYL CITRATE 25 MCG: 50 INJECTION, SOLUTION INTRAMUSCULAR; INTRAVENOUS at 09:31

## 2023-12-05 RX ADMIN — SODIUM CHLORIDE, SODIUM LACTATE, POTASSIUM CHLORIDE, AND CALCIUM CHLORIDE: 600; 310; 30; 20 INJECTION, SOLUTION INTRAVENOUS at 09:19

## 2023-12-05 RX ADMIN — MIDAZOLAM 2 MG: 1 INJECTION INTRAMUSCULAR; INTRAVENOUS at 09:17

## 2023-12-05 RX ADMIN — CLINDAMYCIN PHOSPHATE 600 MG: 600 INJECTION, SOLUTION INTRAVENOUS at 09:25

## 2023-12-05 RX ADMIN — PROPOFOL 60 MCG/KG/MIN: 10 INJECTION, EMULSION INTRAVENOUS at 09:20

## 2023-12-05 RX ADMIN — OXYCODONE HYDROCHLORIDE 5 MG: 5 TABLET ORAL at 11:05

## 2023-12-05 RX ADMIN — FENTANYL CITRATE 25 MCG: 50 INJECTION, SOLUTION INTRAMUSCULAR; INTRAVENOUS at 09:50

## 2023-12-05 SDOH — HEALTH STABILITY: MENTAL HEALTH: CURRENT SMOKER: 0

## 2023-12-05 ASSESSMENT — PAIN SCALES - GENERAL
PAIN_LEVEL: 0
PAINLEVEL_OUTOF10: 5 - MODERATE PAIN
PAINLEVEL_OUTOF10: 0 - NO PAIN
PAINLEVEL_OUTOF10: 5 - MODERATE PAIN
PAINLEVEL_OUTOF10: 5 - MODERATE PAIN
PAINLEVEL_OUTOF10: 2
PAINLEVEL_OUTOF10: 4
PAINLEVEL_OUTOF10: 3

## 2023-12-05 ASSESSMENT — PAIN - FUNCTIONAL ASSESSMENT
PAIN_FUNCTIONAL_ASSESSMENT: 0-10

## 2023-12-05 ASSESSMENT — COLUMBIA-SUICIDE SEVERITY RATING SCALE - C-SSRS
2. HAVE YOU ACTUALLY HAD ANY THOUGHTS OF KILLING YOURSELF?: NO
6. HAVE YOU EVER DONE ANYTHING, STARTED TO DO ANYTHING, OR PREPARED TO DO ANYTHING TO END YOUR LIFE?: NO
1. IN THE PAST MONTH, HAVE YOU WISHED YOU WERE DEAD OR WISHED YOU COULD GO TO SLEEP AND NOT WAKE UP?: NO

## 2023-12-05 NOTE — OP NOTE
Fasciectomy Foot (R) Operative Note     Date: 2023  OR Location: [facility] OR    Name: Colby Cordero III, : 1965, Age: 58 y.o., MRN: [id number], Sex: male    Diagnosis  Pre-op Diagnosis     * Plantar fasciitis of right foot [M72.2] Post-op Diagnosis     * Plantar fasciitis of right foot [M72.2]     Procedures  Fasciectomy Foot  21907 - LA FASCIECTOMY PLANTAR FASCIA PARTIAL SPX      Surgeons      * Libia Blackwell - Primary    Resident/Fellow/Other Assistant:  Surgeon(s) and Role:    Procedure Summary  Anesthesia: Monitor Anesthesia Care  ASA: III  Anesthesia Staff: Anesthesiologist: DO FIFI ChristieAA: KAITLIN Akins  Estimated Blood Loss: 1mL  Intra-op Medications:   Medication Name Total Dose   lidocaine (Xylocaine) 20 mg/mL (2 %) injection 5 mL   BUPivacaine HCl (Marcaine) 0.5 % (5 mg/mL) injection 5 mL   clindamycin in 0.9 % sod chlor (Cleocin) 600 mg/50 mL IVPB 600 mg 600 mg              Anesthesia Record               Intraprocedure I/O Totals          Intake    Propofol Drip 0.00 mL    The total shown is the total volume documented since Anesthesia Start was filed.    Total Intake 0 mL          Specimen: No specimens collected     Staff:   Circulator: MINERVA Wong-CNP  Scrub Person: Desiree Rosario         Drains and/or Catheters: * None in log *    Tourniquet Times:     Total Tourniquet Time Documented:  Leg (Right) - 9 minutes  Total: Leg (Right) - 9 minutes      Implants:     Findings: Taut plantar fascia    Indications: Colby Cordero III is an 58 y.o. male who is having surgery for Plantar fasciitis of right foot [M72.2].     The patient was seen in the preoperative area. The risks, benefits, complications, treatment options, non-operative alternatives, expected recovery and outcomes were discussed with the patient. The possibilities of reaction to medication, pulmonary aspiration, injury to surrounding structures, bleeding, recurrent infection, the need for  additional procedures, failure to diagnose a condition, and creating a complication requiring transfusion or operation were discussed with the patient. The patient concurred with the proposed plan, giving informed consent.  The site of surgery was properly noted/marked if necessary per policy. The patient has been actively warmed in preoperative area. Preoperative antibiotics have been ordered and given within 1 hours of incision. Venous thrombosis prophylaxis are not indicated.    Procedure Details: Patient was brought into the operating room.  IV sedation was administered by the anesthesia team.  A well-padded pneumatic cuff is placed around the patient's right ankle.  The right foot was then prepped and draped in usual sterile manner.  A timeout was taken procedure confirmed.  The limb was exsanguinated and tourniquet inflated to 300 mmHg pressure.  Attention was then directed to the plantar aspect of the right foot.  A 2 cm incision was made from the junction of the plantar and dorsal skin towards the midline of the foot this incision was 2 cm distal from the plantar medial calcaneal tubercle.  The incision was deepened through the subcutaneous layers.  The plantar fascia medial band was identified and cut.  Good release was noted.  Wound was flushed copiously.  Wound was then repaired utilizing 2-0 nylon in vertical mattress fashion.  Xeroform was then placed on the wound followed by 4 x 4's Kerlix and Ace.  Tourniquet was released immediately good capillary refill was noted to the toes.  Complications:  None; patient tolerated the procedure well.    Disposition: PACU - hemodynamically stable.  Condition: stable         Additional Details:     Attending Attestation: I was present and scrubbed for the entire procedure.    Libia Blackwell  Phone Number: [phone]

## 2023-12-05 NOTE — ANESTHESIA PREPROCEDURE EVALUATION
Patient: Colby Cordero III    Procedure Information       Date/Time: 12/05/23 0900    Procedure: Fasciectomy Foot (Right)    Location: [facility] OR 03 / [facility]    Surgeons: Libia Blackwell DPM          There were no vitals filed for this visit.    Past Surgical History:   Procedure Laterality Date    COLONOSCOPY  08/24/2020    Complete Colonoscopy    HERNIA REPAIR  08/24/2020    Hernia Repair    OTHER SURGICAL HISTORY  08/24/2020    Carpal tunnel surgery    OTHER SURGICAL HISTORY  08/24/2020    Tonsillectomy    OTHER SURGICAL HISTORY  08/24/2020    Adenoidectomy    OTHER SURGICAL HISTORY  08/25/2022    Colon surgery    OTHER SURGICAL HISTORY  08/25/2022    Colonoscopy    OTHER SURGICAL HISTORY  08/25/2022    Hernia repair    OTHER SURGICAL HISTORY  08/25/2022    Small bowel resection    SMALL INTESTINE SURGERY  08/24/2020    Small Bowel Resection     Past Medical History:   Diagnosis Date    CKD (chronic kidney disease)     stage 3    Crohn's disease, unspecified, without complications (CMS/[facility]) 08/24/2020    Crohn's disease    Hemorrhage of anus and rectum     Hemorrhage of rectum and anus    Hyperlipidemia, unspecified     Dyslipidemia    Hypertension     Otitis media, unspecified, right ear 01/04/2018    Acute otitis media, right    Personal history of other diseases of the circulatory system     History of hypertension    Personal history of other diseases of the nervous system and sense organs     History of sleep apnea    Personal history of other diseases of the respiratory system 12/08/2018    History of upper respiratory infection    Personal history of other diseases of the respiratory system     History of bronchitis    Rash and other nonspecific skin eruption     Rash    Sleep apnea     Unspecified abdominal pain 08/24/2020    Abdominal cramping     No current facility-administered medications for this encounter.  Prior to Admission medications    Medication Sig Start Date End Date Taking?  "Authorizing Provider   amlodipine-valsartan (Exforge) 5-160 mg tablet Take 1 tablet by mouth once daily.    Historical Provider, MD   cyanocobalamin (Vitamin B-12) 1,000 mcg tablet Take 1 tablet (1,000 mcg) by mouth once daily.    Historical Provider, MD   ergocalciferol (Vitamin D-2) 1.25 MG (03934 UT) capsule Take 1 capsule (1,250 mcg) by mouth once daily. 4/18/19   Historical Provider, MD   metoprolol succinate XL (Toprol-XL) 100 mg 24 hr tablet Take 1 tablet (100 mg) by mouth once daily. 11/10/20   Historical Provider, MD   Stelara injection INJECT 90 MG SUBCUTANEOUSLY EVERY 8 WEEKS AS DIRECTED 8/29/23 2/29/24  Judy MONDRAGON MD     Allergies   Allergen Reactions    Cephalexin Hives    Atorvastatin Myalgia     Joint pain and fatigue     Social History     Tobacco Use    Smoking status: Former     Types: Cigarettes    Smokeless tobacco: Current   Substance Use Topics    Alcohol use: Yes     Alcohol/week: 4.0 standard drinks of alcohol     Types: 4 Standard drinks or equivalent per week     Comment: once a month         Chemistry    Lab Results   Component Value Date/Time     11/17/2023 1439    K 4.1 11/17/2023 1439     11/17/2023 1439    CO2 25 11/17/2023 1439    BUN 15 11/17/2023 1439    CREATININE 1.51 (H) 11/17/2023 1439    Lab Results   Component Value Date/Time    CALCIUM 9.6 11/17/2023 1439    ALKPHOS 62 07/28/2023 1041    AST 16 07/28/2023 1041    ALT 21 07/28/2023 1041    BILITOT 0.4 07/28/2023 1041          Lab Results   Component Value Date/Time    WBC 5.5 06/02/2023 1017    HGB 15.5 06/02/2023 1017    HCT 45.9 06/02/2023 1017     06/02/2023 1017     No results found for: \"PROTIME\", \"PTT\", \"INR\"  No results found for this or any previous visit (from the past 4464 hour(s)).   Relevant Problems   Cardiovascular   (+) Hyperlipidemia   (+) Hypertension      Endocrine   (+) Obesity      GI   (+) Crohn's disease of colon (CMS/HCC)   (+) Enteropathic arthritis associated with Crohn's " disease (CMS/[facility])   (+) Irritable bowel syndrome      /Renal   (+) Stage 3a chronic kidney disease (CMS/[facility])      Neuro/Psych   (+) Peripheral neuropathy      Pulmonary   (+) VALENTINO (obstructive sleep apnea)      Musculoskeletal   (+) Osteoarthritis of foot joint      Eyes, Ears, Nose, and Throat   (+) Conductive hearing loss, bilateral   (+) Hearing loss of right ear due to cerumen impaction   (+) Mixed conductive and sensorineural hearing loss of both ears   (+) Mixed conductive and sensorineural hearing loss, unilateral, left ear with restricted hearing on the contralateral side   (+) Sensorineural hearing loss (SNHL) of left ear   (+) Sensorineural hearing loss (SNHL) of right ear with restricted hearing of left ear      Infectious Disease   (+) Chronic suppurative otitis media of right ear   (+) Latent tuberculosis infection      Other   (+) Enteropathic arthritis associated with Crohn's disease (CMS/[facility])       Clinical information reviewed:    Allergies                NPO Detail:  No data recorded     Physical Exam    Airway  Mallampati: II     Cardiovascular - normal exam  Rhythm: regular  Rate: normal     Dental - normal exam     Pulmonary - normal exam     Abdominal - normal exam  Abdomen: soft             Anesthesia Plan    ASA 3     general     The patient is not a current smoker.  Patient was previously instructed to abstain from smoking on day of procedure.  Patient did not smoke on day of procedure.  Education provided regarding risk of obstructive sleep apnea.  intravenous induction   Anesthetic plan and risks discussed with patient.  Use of blood products discussed with patient who consented to blood products.    Plan discussed with [facility]AA.

## 2023-12-05 NOTE — ANESTHESIA POSTPROCEDURE EVALUATION
Patient: Colby Cordero III    Procedure Summary       Date: 12/05/23 Room / Location: [facility] OR 03 / [facility]    Anesthesia Start: 0919 Anesthesia Stop: 0952    Procedure: Fasciectomy Foot (Right: Foot) Diagnosis:       Plantar fasciitis of right foot      (Plantar fasciitis of right foot [M72.2])    Surgeons: Libia Blackwell DPM Responsible Provider: Flavio Rizvi DO    Anesthesia Type: MAC ASA Status: 3            Anesthesia Type: MAC    Vitals Value Taken Time   /83 12/05/23 1030   Temp 36 °C (96.8 °F) 12/05/23 1030   Pulse 49 12/05/23 1033   Resp 22 12/05/23 1033   SpO2 96 % 12/05/23 1033   Vitals shown include unvalidated device data.    Anesthesia Post Evaluation    Patient location during evaluation: PACU  Patient participation: complete - patient cannot participate  Level of consciousness: sleepy but conscious  Pain score: 0  Pain management: adequate  Multimodal analgesia pain management approach  Airway patency: patent  Two or more strategies used to mitigate risk of obstructive sleep apnea  Cardiovascular status: acceptable and stable  Respiratory status: acceptable and nasal cannula  Hydration status: acceptable  Postoperative Nausea and Vomiting: none        No notable events documented.

## 2023-12-08 NOTE — PROGRESS NOTES
Subjective   Patient ID: [id number]   Colby Cordero III is a 58 y.o. male with Crohn's with IA, HTN, HLD, and BMI 37, presents for follow up on IA    Current IS:  - Stelara started in 9/2017 to present, working well in terms of GI sx, some joint complaints also improved    Prior IS:  - Humira 2013- held in 2014 as was noted to have + quantiferon gold  - Entivyo (Vedolizuman) June 2015 due to recurring GI sx   - SSZ added in 5/2016, no significant improvement, dc  - Remicade 9/16 -> 9/2017, worsening joint pains  - Leflunomide 4608-2883  - MTX 8521-5407  - Budesonide  - Prednisone  - Mesalamine    HPI  The patient is doing well  No morning stiffness, no painful joints or swelling   Compliant with meds.  No side effects reported  No episodes of eye inflammation  No sicca sx  No chest pain, cough or dyspnea  No rashes  No infections    ROS:  As per HPI     Rheum hx (Recall from Dr. Freeman's notes):  Initially saw Dr. Khanna for many years at . Then switched care to and saw Dr. Bell at [facility]. He is now transferring care back to , presents to establish care as well as address PCP concerns as noted on referral.      Crohns: diagnosed (mass in colon, removed), recurrent- segmental crohns in L colon ( at least since 6658-1418- initially presented with bowel changes and found to have mass in 2011 (circumferential mass like appears- underwent resection and repeat colonoscopy in Jan 2012 unremarkable. Then, Jan 2013- recurrent GI symptoms, repeat colonoscopy, friable edematous inflammatory changes and dx with segmental Crohns disease.      IS:   Humira 2013- held in 2014 as was noted to have + quantiferon gold  Started on Entivyo (Vedolizuman) June 2015 due to recurring GI symptoms, May 2016 continue Entyvio however not controlled, added SSZ, no significant improvement  9/2016, switched to Remicade and continued until 9/2017 however worsening joint pain so switched to Stelara on 9/27/2017.   9/2017 to present: Stelara,  working well in terms of GI symtpoms, some joint complaints also improved     Prior Meds show Leflunomide 3235-4232, MTX 8356-5510, SSZ 2016 (pt unable to recall joint benefit from these meds), it appears SSZ was RX by GI. He has also been on Budesonide, prednisone, mesalamine previously.      He has been under care of Dr. Alfaro at [facility]. He was tried on Cymbalta for muscle/joint pain, made him drowsy so stopped.     Pt states for many years now he had been experiencing pain/stiffness in legs, feet, hips, shoulder and arms, and fatigue. About 3-4 weeks ago- he saw his PCP and mentioned above complaints including muscle pain, stiffness, and joint pain. He was recommended to stop Atorvastatin and see if related to it (had been on Atorvastatin for many years). Since stopping he feels about 85-90% better in terms of above sx.   He currently is having issues with plantar fascitis R foot, previous hx on L and needed a procedure  Denies any other current joint pain or swelling  No dactylitis  No significant swelling  No active stiffness currently  Mild soreness in hands, tennis elbow once in while  Intermittent knee pain      ROS:   Denies fever, unintentional weight loss  Denies hx of uveitis, gets annual eye exam, no hx of psoriasis. No hx of blood clot.     ID HX: Saw Dr. Townsend in 2015 for Latent TB: TB test negative in July 2013, while on Humira: routine + PPD on 12/2014, + quant TB, CXR nml.. Treated with Isoniazid and pyridoxine for latent TB. Hep serology previously negative 2013, 2019 Hep C Ab NR.      FMHX of autoimmune disease: None   PSH: CTS on R, plantar fascitis on L, colon surgery  SocHX: Current vape, rare ETOH use    Patient Active Problem List   Diagnosis    Abdominal wall abscess    Abdominal bloating    Chronic mastoiditis of both sides    Bilateral chronic otitis media    Chronic suppurative otitis media of right ear    Conductive hearing loss, bilateral    Hearing loss of right ear due to cerumen  impaction    Mixed conductive and sensorineural hearing loss of both ears    Mixed conductive and sensorineural hearing loss, unilateral, left ear with restricted hearing on the contralateral side    Sensorineural hearing loss (SNHL) of right ear with restricted hearing of left ear    Crohn's disease of colon (CMS/HCC)    Cuboid syndrome    Current every day smoker    Gout    Enteropathic arthritis associated with Crohn's disease (CMS/HCC)    Fatigue    Hyperglycemia    Hyperlipidemia    Hypertension    Inguinal hernia    Irritable bowel syndrome    Latent tuberculosis infection    Mass of shoulder region    Mechanical pain of right foot    Nevus, atypical    Osteoarthritis of foot joint    Perforation of both tympanic membranes due to otitis media    Myalgia    Obesity    Nontraumatic perforation of tympanic membrane    Peripheral neuropathy    Peroneal tendinitis of left lower extremity    Plantar fascial fibromatosis of left foot    Plantar fasciitis, left    Positive BRANNON (antinuclear antibody)    Sensation of fullness in both ears    Sensorineural hearing loss (SNHL) of left ear    Skin tag, acquired    VALENTINO (obstructive sleep apnea)    Snoring    Stage 3a chronic kidney disease (CMS/[facility])    Tinnitus, subjective    Tympanic membrane perforation, left    Vitamin D deficiency    Vitamin B12 deficiency    Plantar fasciitis of right foot        Past Medical History:   Diagnosis Date    CKD (chronic kidney disease)     stage 3    Crohn's disease, unspecified, without complications (CMS/HCC) 08/24/2020    Crohn's disease    Hemorrhage of anus and rectum     Hemorrhage of rectum and anus    Hyperlipidemia, unspecified     Dyslipidemia    Hypertension     Otitis media, unspecified, right ear 01/04/2018    Acute otitis media, right    Personal history of other diseases of the circulatory system     History of hypertension    Personal history of other diseases of the nervous system and sense organs     History of sleep  apnea    Personal history of other diseases of the respiratory system 12/08/2018    History of upper respiratory infection    Personal history of other diseases of the respiratory system     History of bronchitis    Rash and other nonspecific skin eruption     Rash    Sleep apnea     Unspecified abdominal pain 08/24/2020    Abdominal cramping     Past Surgical History:   Procedure Laterality Date    COLONOSCOPY  08/24/2020    Complete Colonoscopy    HERNIA REPAIR  08/24/2020    Hernia Repair    OTHER SURGICAL HISTORY  08/24/2020    Carpal tunnel surgery    OTHER SURGICAL HISTORY  08/24/2020    Tonsillectomy    OTHER SURGICAL HISTORY  08/24/2020    Adenoidectomy    OTHER SURGICAL HISTORY  08/25/2022    Colon surgery    OTHER SURGICAL HISTORY  08/25/2022    Colonoscopy    OTHER SURGICAL HISTORY  08/25/2022    Hernia repair    OTHER SURGICAL HISTORY  08/25/2022    Small bowel resection    SMALL INTESTINE SURGERY  08/24/2020    Small Bowel Resection     Social History     Socioeconomic History    Marital status:      Spouse name: Not on file    Number of children: Not on file    Years of education: Not on file    Highest education level: Not on file   Occupational History    Not on file   Tobacco Use    Smoking status: Former     Types: Cigarettes    Smokeless tobacco: Current   Vaping Use    Vaping Use: Every day   Substance and Sexual Activity    Alcohol use: Yes     Alcohol/week: 4.0 standard drinks of alcohol     Types: 4 Standard drinks or equivalent per week     Comment: once a month    Drug use: Never    Sexual activity: Yes   Other Topics Concern    Not on file   Social History Narrative    Not on file     Social Determinants of Health     Financial Resource Strain: Not on file   Food Insecurity: Not on file   Transportation Needs: Not on file   Physical Activity: Not on file   Stress: Not on file   Social Connections: Not on file   Intimate Partner Violence: Not on file   Housing Stability: Not on file      Allergies   Allergen Reactions    Cephalexin Hives    Atorvastatin Myalgia     Joint pain and fatigue      Current Outpatient Medications:     amlodipine-valsartan (Exforge) 5-160 mg tablet, Take 1 tablet by mouth once daily., Disp: , Rfl:     cyanocobalamin (Vitamin B-12) 1,000 mcg tablet, Take 1 tablet (1,000 mcg) by mouth once daily., Disp: , Rfl:     ergocalciferol (Vitamin D-2) 1.25 MG (46397 UT) capsule, Take 1 capsule (1,250 mcg) by mouth once daily., Disp: , Rfl:     metoprolol succinate XL (Toprol-XL) 100 mg 24 hr tablet, Take 1 tablet (100 mg) by mouth once daily., Disp: , Rfl:     oxyCODONE-acetaminophen (Percocet) 5-325 mg tablet, Take 1 tablet by mouth every 6 hours if needed for severe pain (7 - 10)., Disp: 5 tablet, Rfl: 0     Objective   There were no vitals taken for this visit.     Physical Exam:  General: AAOx3, Cooperative  Eyes: EOMI, conjunctiva clear, sclera white, anicteric  Throat/Mouth: No oral deformities, no cheek swelling, mucosa appear moist, no oral ulcers noted or loss of dentition   Lungs: chest expansion symmetric, clear to auscultation bilaterally. No wheezing, rhonchi, or stridor  Heart: S1, S2, RRR. No murmur or rub  Abdomen: Soft, non-tender without masses  Neuro: CN II-XII grossly intact, no focal deficit  Skin: No rashes, ulcers or photosensitive areas  MSK: Upper Extremities:  Hand/Fingers: No erythema, edema, tenderness or warmth at DIP, PIP, or MCP joints, FROM grossly. Good hand . No nodules. No deformities   Wrists: No erythema, edema, warmth or tenderness at wrist, FROM grossly  Elbows: No tenderness, edema, erythema or warmth at elbows, FROM grossly. No nodules   Shoulders: No edema, erythema, tenderness or warmth at shoulders. FROM  Lower Extremities:   Hips: No obvious deformities. No joint tenderness, normal ROM grossly. Log roll test negative bilaterally. Anna test is negative bilaterally. No trochanteric bursae TTP  Knees: No tenderness, deformities,  edema, rashes, or warmth, normal ROM grossly. No crepitus, no pes anserine bursa TTP   Ankles, feet: No deformities, tenderness, edema, erythema, ulceration, or warmth at the ankle or MTP/IP joints, normal ROM grossly  Spine: No spinal tenderness to palpation. No SI joint tenderness. Gaenslen test negative      Lab Results   Component Value Date    WBC 5.5 06/02/2023    HGB 15.5 06/02/2023    HCT 45.9 06/02/2023    MCV 88 06/02/2023     06/02/2023        Chemistry    Lab Results   Component Value Date/Time     11/17/2023 1439    K 4.1 11/17/2023 1439     11/17/2023 1439    CO2 25 11/17/2023 1439    BUN 15 11/17/2023 1439    CREATININE 1.51 (H) 11/17/2023 1439    Lab Results   Component Value Date/Time    CALCIUM 9.6 11/17/2023 1439    ALKPHOS 62 07/28/2023 1041    AST 16 07/28/2023 1041    ALT 21 07/28/2023 1041    BILITOT 0.4 07/28/2023 1041        Lab Results   Component Value Date    CRP 0.32 06/02/2023      Lab Results   Component Value Date    SEDRATE 6 06/02/2023      Lab Results   Component Value Date    HEPCAB NON-REACTIVE 04/17/2019      Lab Results   Component Value Date    ALT 21 07/28/2023    AST 16 07/28/2023    ALKPHOS 62 07/28/2023    BILITOT 0.4 07/28/2023      US renal complete  Narrative: Interpreted By:  DOROTA KNOTT MD  MRN: [id number]  Patient Name: ROBERT SNYDER     STUDY:  US RENAL BILAT;  6/21/2023 4:13 pm     INDICATION:  CKD stage 3  N18.31: Stage 3a chronic kidney disease R76.8: Positive  BRANNON (antinuclear antibody) R76.8: Positive double stranded DNA  antibody test.     COMPARISON:  08/07/2020     ACCESSION NUMBER(S):  [id number]     ORDERING CLINICIAN:  KWABENA ADAMS     TECHNIQUE:  Grayscale and color Doppler ultrasound evaluation of the kidneys and  urinary bladder. The examination is made technically difficult  secondary to the patient's body habitus as well as by shadowing from  bowel gas.     FINDINGS:  RIGHT KIDNEY:  The right kidney measures 10.8 cm in  length. No hydronephrosis.  Cyst  in the upper pole measures 1.9 cm. No calculus or perinephric  abnormality.     LEFT KIDNEY:  The left kidney measures 11.0 cm in length. No hydronephrosis.  No  mass, calculus, or perinephric abnormality.     BLADDER:  Underdistended, therefore not well assessed.     Impression: No hydronephrosis to suggest urinary tract obstruction.     === 06/09/23 ===  FOOT  1 cm focus of calcification in the distal Achilles. This suggests chronic tendinosis or remote injury.  Advanced right 1st metatarsophalangeal osteoarthritis.    === 06/19/17 ===  MRI enteroprapgy  1. No evidence of acute inflammatory changes involving the bowel.  2. The terminal ileum is located more superiorly than typically seen.      Assessment/Plan    This is a 58 Y M with Crohn's colitis and IA, statin induced myalgia, presenting for follow up  Last seen  by Dr. Freeman in 8/23     Pt with long standing hx of Crohns (currently on Stelara with good control of GI sx), also possible component of IBD related arthritis (joint sx stable), was noted to have recurring myalgias, joint pain without swelling, fatigue, which improved since stopping Atorvastatin (likely statin induced myalgia, normal CK, doubt HMGCR myopathy as sx improved since stopping), no cutaneous manifestations to suggest DM and intact mm strength (doubt PM). Repeat CK nml, aldolase nml, AST/ALT nml. Low titer positive BRANNON and dsDNA ,the most likely explanation is prior TNFi use (Humira and Remicade)- which can cause + BRANNON/dsDNA. No current sx to suggest DILE/SLE. No photosensitive rash, Raynauds, hx of effusions. Nml C3/C4, histone antibody negative, no cytopenia, nml CK. Workup was recently completed by PCP 6/1/23. ESR/CRP nml. CMP shows mild renal insufficiency (cr 1.4 7,GFR > 55, since 4/2019, of note, no proteinuria or RBC)    Labs:  11/23: Cr 1.5  7/28/23 dsDNA 18  6/2023 BRANNON 1:160, dsDNA 24, rest ANGELICA negative. C3/C4 nml. Nml CBC, CK, ESR/CRP, histone Ab,  UA + glucose. SPEP no monoclonal protein  11/2018 BRANNON 1:160, dsDNA 13.     # Crohns colitis, on stelara per GI  # IBD related arthritis, possible component of IA, ongoing since initial dx, currently no synovitis, sx appear well controlled  # Myalgia, likely related to statin use, resolved since stopping. Recommend treatment for HLD with non- statin agents. Nml CK/aldolase, doubt HMGCR myopathy  # Low titer BRANNON, dsDNA, hx of TNFi usse (Humira 7128-3129, Remicade 8191-9103, first + BRANNON 2018). No current sx/findings to suggest DILE/SLE  # CKD stage III, stable, no proteinuria/RBC  # Vitamin D def    - Continue Stelara per GI  - Unable to use chronic NSAIDs as CKD  - Tylenol upto 2gm as needed  - Discussed sx of photosensitive rash, Raynaud's, worsening renal function/cytopenia, will monitor for SLE manifestations but low clinical/serological concerns     RTC in 4 months    Plan, including risks and benefits, was discussed with the patient, informed on how to reach us.     To schedule an appointment, call [phone]  To reach the rheumatology office, call [phone]    Tiffani Mojica MD   Division of Rheumatology  [facility]

## 2023-12-11 ENCOUNTER — APPOINTMENT (OUTPATIENT)
Dept: RHEUMATOLOGY | Facility: CLINIC | Age: 58
End: 2023-12-11
Payer: COMMERCIAL

## 2023-12-19 ENCOUNTER — OFFICE VISIT (OUTPATIENT)
Dept: PODIATRY | Facility: CLINIC | Age: 58
End: 2023-12-19
Payer: COMMERCIAL

## 2023-12-19 DIAGNOSIS — M72.2 PLANTAR FASCIITIS OF RIGHT FOOT: Primary | ICD-10-CM

## 2023-12-19 PROCEDURE — 99024 POSTOP FOLLOW-UP VISIT: CPT | Performed by: PODIATRIST

## 2023-12-19 NOTE — PROGRESS NOTES
History Of Present Illness  Colby Cordero III is a 58 y.o. male presenting with chief complaint of:  POV #1  S/P fascitis release 12/5/23.     Past Medical History  He has a past medical history of CKD (chronic kidney disease), Crohn's disease, unspecified, without complications (CMS/[facility]) (08/24/2020), Hemorrhage of anus and rectum, Hyperlipidemia, unspecified, Hypertension, Otitis media, unspecified, right ear (01/04/2018), Personal history of other diseases of the circulatory system, Personal history of other diseases of the nervous system and sense organs, Personal history of other diseases of the respiratory system (12/08/2018), Personal history of other diseases of the respiratory system, Rash and other nonspecific skin eruption, Sleep apnea, and Unspecified abdominal pain (08/24/2020).    Surgical History  He has a past surgical history that includes Other surgical history (08/24/2020); Other surgical history (08/24/2020); Other surgical history (08/24/2020); Other surgical history (08/25/2022); Other surgical history (08/25/2022); Other surgical history (08/25/2022); Other surgical history (08/25/2022); Hernia repair (08/24/2020); Colonoscopy (08/24/2020); and Small intestine surgery (08/24/2020).     Social History  He reports that he has quit smoking. His smoking use included cigarettes. He uses smokeless tobacco. He reports current alcohol use of about 4.0 standard drinks of alcohol per week. He reports that he does not use drugs.    Family History  Family History   Problem Relation Name Age of Onset    Stroke Mother      Hypertension Mother      Heart disease Father      Cancer Brother      Breast cancer Other Grandmother     Uterine cancer Other Grandmother         Allergies  Cephalexin and Atorvastatin    Medications  Current Outpatient Medications   Medication Sig Dispense Refill    amlodipine-valsartan (Exforge) 5-160 mg tablet Take 1 tablet by mouth once daily.      cyanocobalamin (Vitamin B-12)  1,000 mcg tablet Take 1 tablet (1,000 mcg) by mouth once daily.      ergocalciferol (Vitamin D-2) 1.25 MG (92881 UT) capsule Take 1 capsule (1,250 mcg) by mouth once daily.      metoprolol succinate XL (Toprol-XL) 100 mg 24 hr tablet Take 1 tablet (100 mg) by mouth once daily.      oxyCODONE-acetaminophen (Percocet) 5-325 mg tablet Take 1 tablet by mouth every 6 hours if needed for severe pain (7 - 10). 5 tablet 0     No current facility-administered medications for this visit.       Review of Systems    REVIEW OF SYSTEMS  GENERAL:  Negative for malaise, significant weight loss, fever  CARDIOVASCULAR: leg swelling   MUSCULOSKELETAL:  Negative for joint pain or swelling, back pain, and muscle pain.  SKIN:  Negative for lesions, rash, and itching  PSYCH:  Negative for sleep disturbance, mood disorder and recent psychosocial stressors  NEURO: Negative, denies any burning, tingling or numbness     Objective:   Vasc: DP and PT pulses are palpable bilateral.  CFT is less than 3 seconds bilateral.  Skin temperature is warm to cool proximal to distal bilateral.      Neuro:  Light touch is intact to the foot bilateral.      Derm: Nails are normal. Skin is supple with normal texture and turgor noted.  Webspaces are clean, dry and intact bilateral.  There are no hyperkeratoses, ulcerations, verruca or other lesions noted.    Incision site is well-healed.  Ortho: Muscle strength is 5/5 for all pedal groups tested.  Ankle joint, subtalar joint, 1st MPJ and lesser MPJ ROM is full and without pain or crepitus.  The foot type is rectus bilateral off weight bearing.  There are no structural deformities noted.  Decreased pain than prior to surgery.    Assessment/Plan     Problem List Items Addressed This Visit       Plantar fasciitis of right foot - Primary       Patient is status post plantar fascia release with good result.  Patient can begin increasing weightbearing.  He can get foot wet.  Do not advise soaking the foot.  Patient  is doing well.           Eleanor Brito, CMA

## 2024-01-03 ENCOUNTER — OFFICE VISIT (OUTPATIENT)
Dept: PRIMARY CARE | Facility: CLINIC | Age: 59
End: 2024-01-03
Payer: COMMERCIAL

## 2024-01-03 VITALS
DIASTOLIC BLOOD PRESSURE: 81 MMHG | BODY MASS INDEX: 38.06 KG/M2 | WEIGHT: 281 LBS | TEMPERATURE: 98 F | SYSTOLIC BLOOD PRESSURE: 142 MMHG | HEIGHT: 72 IN | HEART RATE: 57 BPM

## 2024-01-03 DIAGNOSIS — F17.210 CIGARETTE NICOTINE DEPENDENCE WITHOUT COMPLICATION: ICD-10-CM

## 2024-01-03 DIAGNOSIS — M25.50 ARTHRALGIA, UNSPECIFIED JOINT: ICD-10-CM

## 2024-01-03 DIAGNOSIS — Z12.5 PROSTATE CANCER SCREENING: ICD-10-CM

## 2024-01-03 DIAGNOSIS — E78.2 MIXED HYPERLIPIDEMIA: ICD-10-CM

## 2024-01-03 DIAGNOSIS — I10 PRIMARY HYPERTENSION: Primary | ICD-10-CM

## 2024-01-03 DIAGNOSIS — M79.10 MYALGIA: ICD-10-CM

## 2024-01-03 PROBLEM — G56.01 CARPAL TUNNEL SYNDROME ON RIGHT: Status: ACTIVE | Noted: 2024-01-03

## 2024-01-03 PROCEDURE — 3077F SYST BP >= 140 MM HG: CPT | Performed by: INTERNAL MEDICINE

## 2024-01-03 PROCEDURE — 99214 OFFICE O/P EST MOD 30 MIN: CPT | Performed by: INTERNAL MEDICINE

## 2024-01-03 PROCEDURE — 3079F DIAST BP 80-89 MM HG: CPT | Performed by: INTERNAL MEDICINE

## 2024-01-03 RX ORDER — AMLODIPINE AND VALSARTAN 10; 160 MG/1; MG/1
1 TABLET ORAL DAILY
Qty: 90 TABLET | Refills: 3 | Status: SHIPPED
Start: 2024-01-03 | End: 2024-01-03

## 2024-01-03 RX ORDER — AMLODIPINE BESYLATE 10 MG/1
10 TABLET ORAL DAILY
Qty: 90 TABLET | Refills: 3 | Status: SHIPPED
Start: 2024-01-03 | End: 2024-01-04 | Stop reason: SDUPTHER

## 2024-01-03 RX ORDER — METOPROLOL SUCCINATE 100 MG/1
100 TABLET, EXTENDED RELEASE ORAL DAILY
Qty: 90 TABLET | Refills: 3 | Status: SHIPPED
Start: 2024-01-03 | End: 2024-01-04 | Stop reason: SDUPTHER

## 2024-01-03 RX ORDER — VALSARTAN 160 MG/1
160 TABLET ORAL DAILY
Qty: 90 TABLET | Refills: 3 | Status: SHIPPED
Start: 2024-01-03 | End: 2024-01-04 | Stop reason: SDUPTHER

## 2024-01-03 RX ORDER — USTEKINUMAB 90 MG/ML
90 INJECTION, SOLUTION SUBCUTANEOUS
COMMUNITY

## 2024-01-03 RX ORDER — CHOLECALCIFEROL (VITAMIN D3) 125 MCG
220 CAPSULE ORAL AS NEEDED
COMMUNITY
End: 2024-02-19 | Stop reason: WASHOUT

## 2024-01-03 RX ORDER — ACETAMINOPHEN, DEXTROMETHORPHAN HBR, DOXYLAMINE SUCCINATE, PHENYLEPHRINE HCL 650; 20; 12.5; 1 MG/30ML; MG/30ML; MG/30ML; MG/30ML
1 SOLUTION ORAL DAILY
COMMUNITY
Start: 2023-06-09

## 2024-01-03 RX ORDER — AMLODIPINE AND VALSARTAN 10; 160 MG/1; MG/1
1 TABLET ORAL DAILY
COMMUNITY
Start: 2023-10-16 | End: 2024-01-03 | Stop reason: SDUPTHER

## 2024-01-03 RX ORDER — DULOXETIN HYDROCHLORIDE 30 MG/1
30 CAPSULE, DELAYED RELEASE ORAL DAILY
Qty: 90 CAPSULE | Refills: 3 | Status: SHIPPED
Start: 2024-01-03 | End: 2024-01-04 | Stop reason: SDUPTHER

## 2024-01-03 NOTE — PROGRESS NOTES
Subjective   Patient ID: Colby Cordero III is a 58 y.o. male who presents for Med Management.    HPI patient presents to the office today for medication management.  Patient needs refills on blood pressure medication.  Patient was having longstanding joint pain related to statin and has discontinued atorvastatin and felt better after this.  I have reviewed his prior consultation with rheumatology for positive BRANNON.  Felt to be related to prior TNF use.  .  We discussed CT lung cancer screening as well today the risk versus benefits of this patient would like to proceed with this test which has been ordered today.  Also discussed starting him on duloxetine for myalgia arthralgia he would like to try this.  I will see him back in about 6 weeks after starting medication.    Review of Systems   Constitutional:  Negative for chills and fever.   HENT:  Negative for sore throat.    Eyes:  Negative for visual disturbance.   Respiratory:  Negative for cough and shortness of breath.    Cardiovascular:  Negative for chest pain, palpitations and leg swelling.   Gastrointestinal:  Negative for abdominal pain, constipation, diarrhea, nausea and vomiting.   Genitourinary:  Negative for dysuria.   Musculoskeletal:  Positive for arthralgias and myalgias.   Skin:  Negative for rash.   Neurological:  Negative for dizziness, syncope and light-headedness.       Objective   /81   Pulse 57   Temp 36.7 °C (98 °F) (Temporal)   Ht 1.829 m (6')   Wt 127 kg (281 lb)   BMI 38.11 kg/m²     Physical Exam  Vitals and nursing note reviewed.   Constitutional:       General: He is not in acute distress.     Appearance: Normal appearance. He is not toxic-appearing.   HENT:      Head: Normocephalic and atraumatic.      Mouth/Throat:      Mouth: Mucous membranes are moist.      Pharynx: Oropharynx is clear. No oropharyngeal exudate.   Eyes:      Pupils: Pupils are equal, round, and reactive to light.   Cardiovascular:      Rate and Rhythm:  Normal rate and regular rhythm.      Heart sounds: Normal heart sounds.   Pulmonary:      Effort: Pulmonary effort is normal. No respiratory distress.      Breath sounds: Normal breath sounds. No wheezing.   Abdominal:      General: Bowel sounds are normal. There is no distension.      Palpations: Abdomen is soft. There is no mass.      Tenderness: There is no abdominal tenderness.   Musculoskeletal:      Cervical back: Neck supple. No tenderness.      Right lower leg: No edema.      Left lower leg: No edema.   Skin:     General: Skin is warm and dry.   Neurological:      General: No focal deficit present.      Mental Status: He is alert and oriented to person, place, and time.      Cranial Nerves: No cranial nerve deficit.   Psychiatric:         Mood and Affect: Mood normal.         Behavior: Behavior normal.         Thought Content: Thought content normal.         Judgment: Judgment normal.         Assessment/Plan   Problem List Items Addressed This Visit             ICD-10-CM    Hyperlipidemia E78.5    Hypertension - Primary I10    Myalgia M79.10    Arthralgia M25.50    Cigarette nicotine dependence without complication F17.210    Relevant Orders    CT lung screening low dose    Prostate cancer screening Z12.5    Relevant Orders    Prostate Spec.Ag,Screen (Completed)     Lipidemia: Intolerant to statins may consider different nonstatin cholesterol medication in the future    Hypertension: Chronic, stable continue amlodipine metoprolol and losartan.    Myalgia/arthralgia: Will start the patient on duloxetine see him back in 6 weeks for recheck.    Cigarette nicotine dependence: Will do a CT lung cancer screen patient has greater than 20-pack-year history of smoking.    Prostate cancer screening: We will check a PSA level

## 2024-01-04 ENCOUNTER — LAB (OUTPATIENT)
Dept: LAB | Facility: LAB | Age: 59
End: 2024-01-04
Payer: COMMERCIAL

## 2024-01-04 DIAGNOSIS — M79.10 MYALGIA: ICD-10-CM

## 2024-01-04 DIAGNOSIS — Z12.5 PROSTATE CANCER SCREENING: ICD-10-CM

## 2024-01-04 DIAGNOSIS — I10 PRIMARY HYPERTENSION: ICD-10-CM

## 2024-01-04 DIAGNOSIS — M25.50 ARTHRALGIA, UNSPECIFIED JOINT: ICD-10-CM

## 2024-01-04 LAB — PSA SERPL-MCNC: 1.58 NG/ML

## 2024-01-04 PROCEDURE — 36415 COLL VENOUS BLD VENIPUNCTURE: CPT

## 2024-01-04 PROCEDURE — 84153 ASSAY OF PSA TOTAL: CPT

## 2024-01-05 RX ORDER — DULOXETIN HYDROCHLORIDE 30 MG/1
30 CAPSULE, DELAYED RELEASE ORAL DAILY
Qty: 90 CAPSULE | Refills: 3 | Status: SHIPPED | OUTPATIENT
Start: 2024-01-05 | End: 2025-01-04

## 2024-01-05 RX ORDER — AMLODIPINE BESYLATE 10 MG/1
10 TABLET ORAL DAILY
Qty: 90 TABLET | Refills: 3 | Status: SHIPPED | OUTPATIENT
Start: 2024-01-05 | End: 2025-01-04

## 2024-01-05 RX ORDER — VALSARTAN 160 MG/1
160 TABLET ORAL DAILY
Qty: 90 TABLET | Refills: 3 | Status: SHIPPED | OUTPATIENT
Start: 2024-01-05 | End: 2024-05-31 | Stop reason: SDUPTHER

## 2024-01-05 RX ORDER — METOPROLOL SUCCINATE 100 MG/1
100 TABLET, EXTENDED RELEASE ORAL DAILY
Qty: 90 TABLET | Refills: 3 | Status: SHIPPED | OUTPATIENT
Start: 2024-01-05 | End: 2025-01-04

## 2024-01-18 ENCOUNTER — APPOINTMENT (OUTPATIENT)
Dept: RADIOLOGY | Facility: HOSPITAL | Age: 59
End: 2024-01-18
Payer: COMMERCIAL

## 2024-01-22 ENCOUNTER — TELEPHONE (OUTPATIENT)
Dept: GASTROENTEROLOGY | Facility: CLINIC | Age: 59
End: 2024-01-22
Payer: COMMERCIAL

## 2024-01-22 NOTE — TELEPHONE ENCOUNTER
Patient has blood in stool when he wipes; has appointment with Dr. Muniz in March; would like a sooner appointment

## 2024-01-22 NOTE — TELEPHONE ENCOUNTER
Pt has hx of crohns. He is on stelara q 8 weeks. He had Covid a few weeks ago which gave him some GI symptoms including diarrhea. He has more constipation like symptoms currently. He is moving his bowels a few times a day but does not feel like he is completely evacuating. He has been noticing BRB with stool x 2 weeks but getting worse. He is also reporting lower abdominal pain. He has follow up scheduled with Rula Becrera tomorrow to discuss.

## 2024-01-23 ENCOUNTER — OFFICE VISIT (OUTPATIENT)
Dept: GASTROENTEROLOGY | Facility: CLINIC | Age: 59
End: 2024-01-23
Payer: COMMERCIAL

## 2024-01-23 ENCOUNTER — LAB (OUTPATIENT)
Dept: LAB | Facility: LAB | Age: 59
End: 2024-01-23
Payer: COMMERCIAL

## 2024-01-23 VITALS
WEIGHT: 276 LBS | HEART RATE: 68 BPM | HEIGHT: 72 IN | DIASTOLIC BLOOD PRESSURE: 93 MMHG | BODY MASS INDEX: 37.38 KG/M2 | SYSTOLIC BLOOD PRESSURE: 142 MMHG

## 2024-01-23 DIAGNOSIS — K50.10 CROHN'S DISEASE OF COLON WITHOUT COMPLICATION (MULTI): Primary | ICD-10-CM

## 2024-01-23 DIAGNOSIS — K62.5 RECTAL BLEEDING: ICD-10-CM

## 2024-01-23 DIAGNOSIS — K64.8 OTHER HEMORRHOIDS: ICD-10-CM

## 2024-01-23 DIAGNOSIS — K50.10 CROHN'S DISEASE OF COLON WITHOUT COMPLICATION (MULTI): ICD-10-CM

## 2024-01-23 LAB
ALBUMIN SERPL BCP-MCNC: 4.4 G/DL (ref 3.4–5)
ALP SERPL-CCNC: 64 U/L (ref 33–120)
ALT SERPL W P-5'-P-CCNC: 22 U/L (ref 10–52)
ANION GAP SERPL CALC-SCNC: 13 MMOL/L (ref 10–20)
AST SERPL W P-5'-P-CCNC: 22 U/L (ref 9–39)
BILIRUB SERPL-MCNC: 0.5 MG/DL (ref 0–1.2)
BUN SERPL-MCNC: 18 MG/DL (ref 6–23)
CALCIUM SERPL-MCNC: 9.4 MG/DL (ref 8.6–10.3)
CHLORIDE SERPL-SCNC: 107 MMOL/L (ref 98–107)
CO2 SERPL-SCNC: 26 MMOL/L (ref 21–32)
CREAT SERPL-MCNC: 1.61 MG/DL (ref 0.5–1.3)
CRP SERPL-MCNC: 1.2 MG/DL
EGFRCR SERPLBLD CKD-EPI 2021: 49 ML/MIN/1.73M*2
ERYTHROCYTE [DISTWIDTH] IN BLOOD BY AUTOMATED COUNT: 13.3 % (ref 11.5–14.5)
ERYTHROCYTE [SEDIMENTATION RATE] IN BLOOD BY WESTERGREN METHOD: 11 MM/H (ref 0–20)
GLUCOSE SERPL-MCNC: 88 MG/DL (ref 74–99)
HCT VFR BLD AUTO: 41.4 % (ref 41–52)
HGB BLD-MCNC: 14.1 G/DL (ref 13.5–17.5)
MCH RBC QN AUTO: 29.7 PG (ref 26–34)
MCHC RBC AUTO-ENTMCNC: 34.1 G/DL (ref 32–36)
MCV RBC AUTO: 87 FL (ref 80–100)
NRBC BLD-RTO: 0 /100 WBCS (ref 0–0)
PLATELET # BLD AUTO: 248 X10*3/UL (ref 150–450)
POTASSIUM SERPL-SCNC: 4.4 MMOL/L (ref 3.5–5.3)
PROT SERPL-MCNC: 6.9 G/DL (ref 6.4–8.2)
RBC # BLD AUTO: 4.74 X10*6/UL (ref 4.5–5.9)
SODIUM SERPL-SCNC: 142 MMOL/L (ref 136–145)
WBC # BLD AUTO: 7.8 X10*3/UL (ref 4.4–11.3)

## 2024-01-23 PROCEDURE — 85027 COMPLETE CBC AUTOMATED: CPT

## 2024-01-23 PROCEDURE — 3080F DIAST BP >= 90 MM HG: CPT | Performed by: REGISTERED NURSE

## 2024-01-23 PROCEDURE — 86140 C-REACTIVE PROTEIN: CPT

## 2024-01-23 PROCEDURE — 80053 COMPREHEN METABOLIC PANEL: CPT

## 2024-01-23 PROCEDURE — 85652 RBC SED RATE AUTOMATED: CPT

## 2024-01-23 PROCEDURE — 3077F SYST BP >= 140 MM HG: CPT | Performed by: REGISTERED NURSE

## 2024-01-23 PROCEDURE — 36415 COLL VENOUS BLD VENIPUNCTURE: CPT

## 2024-01-23 PROCEDURE — 99213 OFFICE O/P EST LOW 20 MIN: CPT | Performed by: REGISTERED NURSE

## 2024-01-23 RX ORDER — HYDROCORTISONE ACETATE 25 MG/1
25 SUPPOSITORY RECTAL NIGHTLY
Qty: 12 SUPPOSITORY | Refills: 0 | Status: SHIPPED | OUTPATIENT
Start: 2024-01-23

## 2024-01-23 ASSESSMENT — ENCOUNTER SYMPTOMS
MYALGIAS: 0
EYES NEGATIVE: 1
NERVOUS/ANXIOUS: 0
ARTHRALGIAS: 0
BLOOD IN STOOL: 1
CONSTIPATION: 1
ENDOCRINE NEGATIVE: 1
ABDOMINAL PAIN: 0
VOMITING: 0
DIARRHEA: 0
SHORTNESS OF BREATH: 0
UNEXPECTED WEIGHT CHANGE: 0
ABDOMINAL DISTENTION: 0
JOINT SWELLING: 0
ANAL BLEEDING: 0
EYE PAIN: 0
NAUSEA: 0
RECTAL PAIN: 0
APPETITE CHANGE: 0
COUGH: 0
DIFFICULTY URINATING: 0

## 2024-01-23 NOTE — PROGRESS NOTES
REASON FOR VISIT: Add-on appointment for rectal bleeding. History of Crohn's disease    HPI:  Colby Cordero III is a 58 y.o. male who presents for longstanding segmental Crohn's disease diagnosed in 2011.  S/P sigmoid resection in 2011. He has been on Stelara after failing Humira and Remicade.  He has longstanding joint pain which was related to statin and has discontinued atorvastatin and felt much better.  His joint pain has improved significantly.  He is due for his next colonoscopy in 2024.  He injects Stelara every 8 weeks.      Patient states that he is here for rectal bleeding. States that he had COVID 2 weeks ago.  Developed diarrhea and blood in the stool. Then became constipated.   Normally moves bowels 1-2x  a day.  States that he is now constipated and is straining to move his bowels.  He has tried Dulcolax recently.  Has not used MiraLAX or a fiber supplement.  He is seeing less blood in the stool and less blood on the toilet tissue compared to 2 weeks ago.    He has occasional abdominal pain and bloating depending on what he eats.  He denies reflux, dysphagia, nausea, vomiting.  He denies joint pain, skin rashes, lesions or painful nodules, visual changes, eye pain or oral sores.  He endorses irritation around the anal area.     He feels well on Stelara injections every 8 weeks.  He is due to inject Stelara around February 12, 2024.  He denies a history of NSAID use.      Med list notable for Stelara 90mg injection every 8 weeks     Labs   Lab Results   Component Value Date    WBC 5.5 06/02/2023    HGB 15.5 06/02/2023    HCT 45.9 06/02/2023    MCV 88 06/02/2023     06/02/2023     Lab Results   Component Value Date     11/17/2023    K 4.1 11/17/2023     11/17/2023    CO2 25 11/17/2023    BUN 15 11/17/2023    CREATININE 1.51 (H) 11/17/2023    GLUCOSE 83 11/17/2023    CALCIUM 9.6 11/17/2023      Lab Results   Component Value Date    ALKPHOS 62 07/28/2023    ALKPHOS 66 06/02/2023     "ALKPHOS 78 04/17/2019    BILITOT 0.4 07/28/2023    BILITOT 0.7 06/02/2023    BILITOT 0.6 04/17/2019    BILIDIR 0.1 07/28/2023    PROT 6.7 07/28/2023    PROT 7.0 06/02/2023    PROT 7.0 06/02/2023    ALT 21 07/28/2023    ALT 21 06/02/2023    ALT 33 04/17/2019    AST 16 07/28/2023    AST 17 06/02/2023    AST 30 04/17/2019      No results found for: \"INR\"   Lab Results   Component Value Date    IRON 141 04/17/2019    TIBC 309 04/17/2019      No results found for: \"FERRITIN\"   Lab Results   Component Value Date    TSH 0.97 06/02/2023    FREET4 0.80 04/17/2019        Previous Endoscopic evaluation:     Colonoscopy - 12/16/2021 - Dr Muniz     Impression:            - The examined portion of the ileum was normal.                         - Patent end-to-end colo-colonic anastomosis,                          characterized by healthy appearing mucosa.                         - Pseudopolyps in the descending colon.                         - Simple Endoscopic Score for Crohn's Disease: 0,                          mucosal inflammatory changes. Biopsied.                         - One 3 mm polyp in the descending colon, removed with                          a cold biopsy forceps. Resected and retrieved.                         - Diverticulosis in the entire examined colon.                         - External hemorrhoids.    Repeat in 3 years for surveillance - December 2024     Biopsy Results -  Left colon - Tubular Adenoma.  Biopsies negative for granulomas or dysplasia        REVIEW OF SYSTEMS    Review of Systems   Constitutional:  Negative for appetite change and unexpected weight change.   HENT:  Negative for mouth sores.    Eyes: Negative.  Negative for pain and visual disturbance.   Respiratory:  Negative for cough and shortness of breath.    Cardiovascular:  Negative for chest pain.   Gastrointestinal:  Positive for blood in stool and constipation. Negative for abdominal distention, abdominal pain, anal bleeding, diarrhea, " nausea, rectal pain and vomiting.   Endocrine: Negative.    Genitourinary:  Negative for difficulty urinating.   Musculoskeletal:  Negative for arthralgias, joint swelling and myalgias.   Skin:  Negative for rash.   Allergic/Immunologic: Negative for environmental allergies and food allergies.   Psychiatric/Behavioral:  The patient is not nervous/anxious.           Allergies   Allergen Reactions    Cephalexin Hives    Atorvastatin Myalgia     Joint pain and fatigue       Past Medical History:   Diagnosis Date    CKD (chronic kidney disease)     stage 3    Crohn's disease, unspecified, without complications (CMS/[facility]) 08/24/2020    Crohn's disease    Hemorrhage of anus and rectum     Hemorrhage of rectum and anus    Hyperlipidemia, unspecified     Dyslipidemia    Hypertension     Otitis media, unspecified, right ear 01/04/2018    Acute otitis media, right    Personal history of other diseases of the circulatory system     History of hypertension    Personal history of other diseases of the nervous system and sense organs     History of sleep apnea    Personal history of other diseases of the respiratory system 12/08/2018    History of upper respiratory infection    Personal history of other diseases of the respiratory system     History of bronchitis    Rash and other nonspecific skin eruption     Rash    Sleep apnea     Unspecified abdominal pain 08/24/2020    Abdominal cramping       Past Surgical History:   Procedure Laterality Date    COLONOSCOPY  08/24/2020    Complete Colonoscopy    HERNIA REPAIR  08/24/2020    Hernia Repair    OTHER SURGICAL HISTORY  08/24/2020    Carpal tunnel surgery    OTHER SURGICAL HISTORY  08/24/2020    Tonsillectomy    OTHER SURGICAL HISTORY  08/24/2020    Adenoidectomy    OTHER SURGICAL HISTORY  08/25/2022    Colon surgery    OTHER SURGICAL HISTORY  08/25/2022    Colonoscopy    OTHER SURGICAL HISTORY  08/25/2022    Hernia repair    OTHER SURGICAL HISTORY  08/25/2022    Small bowel  resection    SMALL INTESTINE SURGERY  08/24/2020    Small Bowel Resection       Family History   Problem Relation Name Age of Onset    Stroke Mother      Hypertension Mother      Heart disease Father      Cancer Brother      Breast cancer Other Grandmother     Uterine cancer Other Grandmother        Social History     Tobacco Use    Smoking status: Former     Types: Cigarettes    Smokeless tobacco: Current   Substance Use Topics    Alcohol use: Yes     Alcohol/week: 4.0 standard drinks of alcohol     Types: 4 Standard drinks or equivalent per week     Comment: once a month       Current Outpatient Medications   Medication Sig Dispense Refill    amLODIPine (Norvasc) 10 mg tablet Take 1 tablet (10 mg) by mouth once daily. 90 tablet 3    cyanocobalamin, vitamin B-12, (Vitamin B-12) 1,000 mcg tablet extended release Take 1 tablet (1,000 mcg) by mouth once daily.      DULoxetine (Cymbalta) 30 mg DR capsule Take 1 capsule (30 mg) by mouth once daily. Do not crush or chew. 90 capsule 3    ergocalciferol (Vitamin D-2) 1.25 MG (33117 UT) capsule Take 1 capsule (1,250 mcg) by mouth once daily.      metoprolol succinate XL (Toprol-XL) 100 mg 24 hr tablet Take 1 tablet (100 mg) by mouth once daily. 90 tablet 3    ustekinumab (Stelara) injection Inject 1 mL (90 mg) under the skin every 3 months.      valsartan (Diovan) 160 mg tablet Take 1 tablet (160 mg) by mouth once daily. 90 tablet 3    cyanocobalamin (Vitamin B-12) 1,000 mcg tablet Take 1 tablet (1,000 mcg) by mouth once daily.      naproxen sodium 220 mg capsule Take 220 mg by mouth if needed.       No current facility-administered medications for this visit.       PHYSICAL EXAM:  Ht 1.829 m (6')   Wt 125 kg (276 lb)   BMI 37.43 kg/m²      Physical Exam  Constitutional:       Appearance: Normal appearance.   HENT:      Head: Normocephalic and atraumatic.      Nose: Nose normal.   Eyes:      Pupils: Pupils are equal, round, and reactive to light.   Cardiovascular:       Rate and Rhythm: Normal rate and regular rhythm.   Pulmonary:      Effort: Pulmonary effort is normal.      Breath sounds: Normal breath sounds.   Abdominal:      General: Abdomen is flat. Bowel sounds are normal. There is no distension.      Palpations: Abdomen is soft. There is no mass.      Tenderness: There is no abdominal tenderness. There is no guarding or rebound.      Hernia: No hernia is present.   Genitourinary:     Comments: Normal rectal exam.  No evidence of excoriation, masses, fissures or fistula..  Small nonthrombosed external hemorrhoids.  Digital rectal exam-no blood return on gloved finger.  No palpable masses  Musculoskeletal:         General: Normal range of motion.      Cervical back: Normal range of motion and neck supple.   Skin:     General: Skin is warm and dry.   Neurological:      Mental Status: He is alert and oriented to person, place, and time.   Psychiatric:         Mood and Affect: Mood normal.         Behavior: Behavior normal.        ASSESSMENT    History of Crohn's disease.  Here for an add-on appointment for a history of rectal bleeding.  Had COVID 2 weeks ago developed diarrhea and hematochezia.  Then became a bit constipated and strains to move bowels.  Has seen blood on the stool and on the toilet tissue for the past 2 weeks which is slowly improving.  Now constipated and straining to move bowels denies history of recent NSAID use.  Last colonoscopy was December 2021-advised to repeat in 3 years for surveillance.    PLAN    Check blood tests today  Submit stool fecal calprotectin if you develop diarrhea and worsening rectal bleeding  I sent a prescription for hydrocortisone 25 mg rectal suppository- insert 1 at bedtime for up to 6 nights for rectal bleeding and discomfort. 1- box - #12. RF#0   Follow-up with Dr. Muniz in the GI clinic in March 2024 as scheduled  Continue Stelara injections every 8 weeks-due in early February 2024  Use Colace stool softener or Benefiber 2 to 3  teaspoons twice a day mixed in soft food or liquid or begin MiraLAX 17 g mixed in 6-8 ounces of water and drink in the evening.  MiraLAX takes about 3 to 4 days to work.  I recommend getting a regular bowel regimen which may help to prevent constipation, straining and hemorrhoids/rectal bleeding.   Call the office if symptoms do not improve in the next week  You will be due for surveillance colonoscopy for Crohn's disease in December 2024      Rula Becerra, MINERVA-NTIIN      Date: 1/23/2024  Time: 2:01 PM

## 2024-01-24 ENCOUNTER — LAB (OUTPATIENT)
Dept: LAB | Facility: LAB | Age: 59
End: 2024-01-24
Payer: COMMERCIAL

## 2024-01-24 DIAGNOSIS — K50.10 CROHN'S DISEASE OF COLON WITHOUT COMPLICATION (MULTI): ICD-10-CM

## 2024-01-24 DIAGNOSIS — K62.5 RECTAL BLEEDING: ICD-10-CM

## 2024-01-24 DIAGNOSIS — K64.8 OTHER HEMORRHOIDS: ICD-10-CM

## 2024-01-24 PROCEDURE — 83993 ASSAY FOR CALPROTECTIN FECAL: CPT

## 2024-01-28 LAB — CALPROTECTIN STL-MCNT: 730 UG/G

## 2024-01-31 PROBLEM — Z12.5 PROSTATE CANCER SCREENING: Status: ACTIVE | Noted: 2024-01-31

## 2024-01-31 PROBLEM — F17.210 CIGARETTE NICOTINE DEPENDENCE WITHOUT COMPLICATION: Status: ACTIVE | Noted: 2024-01-31

## 2024-01-31 PROBLEM — M25.50 ARTHRALGIA: Status: ACTIVE | Noted: 2024-01-31

## 2024-01-31 ASSESSMENT — ENCOUNTER SYMPTOMS
DIARRHEA: 0
COUGH: 0
PALPITATIONS: 0
MYALGIAS: 1
FEVER: 0
SORE THROAT: 0
CONSTIPATION: 0
NAUSEA: 0
LIGHT-HEADEDNESS: 0
DIZZINESS: 0
CHILLS: 0
ABDOMINAL PAIN: 0
ARTHRALGIAS: 1
SHORTNESS OF BREATH: 0
VOMITING: 0
DYSURIA: 0

## 2024-02-01 ENCOUNTER — HOSPITAL ENCOUNTER (OUTPATIENT)
Dept: RADIOLOGY | Facility: HOSPITAL | Age: 59
Discharge: HOME | End: 2024-02-01
Payer: COMMERCIAL

## 2024-02-01 DIAGNOSIS — F17.210 CIGARETTE NICOTINE DEPENDENCE WITHOUT COMPLICATION: ICD-10-CM

## 2024-02-01 PROCEDURE — 71271 CT THORAX LUNG CANCER SCR C-: CPT

## 2024-02-01 PROCEDURE — 71271 CT THORAX LUNG CANCER SCR C-: CPT | Performed by: RADIOLOGY

## 2024-02-01 NOTE — RESULT ENCOUNTER NOTE
CT lung cancer screening showed small bilateral likely benign nodules.  As well as mild emphysema at the top of the lungs.  There are mild coronary calcifications that correlates with prior CT calcium score.  Repeat CT lung cancer screening in 1 year.

## 2024-02-19 PROBLEM — M79.673 PAIN OF FOOT: Status: RESOLVED | Noted: 2024-02-19 | Resolved: 2024-02-19

## 2024-02-19 PROBLEM — H66.90 CHRONIC OTITIS MEDIA: Status: RESOLVED | Noted: 2023-10-09 | Resolved: 2024-02-19

## 2024-02-19 PROBLEM — K62.5 HEMORRHAGE OF RECTUM AND ANUS: Status: RESOLVED | Noted: 2024-01-23 | Resolved: 2024-02-19

## 2024-02-19 PROBLEM — M07.60 ENTEROPATHIC ARTHRITIS: Status: ACTIVE | Noted: 2024-02-19

## 2024-02-19 PROBLEM — K64.9 HEMORRHOIDS: Status: ACTIVE | Noted: 2024-01-23

## 2024-02-19 PROBLEM — R21 RASH: Status: RESOLVED | Noted: 2024-02-19 | Resolved: 2024-02-19

## 2024-02-19 PROBLEM — Z86.79 HISTORY OF HYPERTENSION: Status: ACTIVE | Noted: 2024-02-19

## 2024-02-19 PROBLEM — E78.5 DYSLIPIDEMIA: Status: ACTIVE | Noted: 2022-09-14

## 2024-02-19 PROBLEM — M79.673 PAIN OF PLANTAR ASPECT OF HEEL: Status: RESOLVED | Noted: 2024-02-19 | Resolved: 2024-02-19

## 2024-02-19 PROBLEM — S86.019A RUPTURE OF ACHILLES TENDON: Status: RESOLVED | Noted: 2024-02-19 | Resolved: 2024-02-19

## 2024-02-19 PROBLEM — M54.50 LOW BACK PAIN: Status: RESOLVED | Noted: 2024-02-19 | Resolved: 2024-02-19

## 2024-02-19 PROBLEM — J06.9 UPPER RESPIRATORY TRACT INFECTION: Status: RESOLVED | Noted: 2024-02-19 | Resolved: 2024-02-19

## 2024-02-19 PROBLEM — S39.012A STRAIN OF LUMBAR REGION: Status: RESOLVED | Noted: 2024-02-19 | Resolved: 2024-02-19

## 2024-03-05 ENCOUNTER — OFFICE VISIT (OUTPATIENT)
Dept: GASTROENTEROLOGY | Facility: CLINIC | Age: 59
End: 2024-03-05
Payer: COMMERCIAL

## 2024-03-05 VITALS
HEART RATE: 69 BPM | SYSTOLIC BLOOD PRESSURE: 136 MMHG | HEIGHT: 71 IN | WEIGHT: 272 LBS | DIASTOLIC BLOOD PRESSURE: 70 MMHG | TEMPERATURE: 97.1 F | BODY MASS INDEX: 38.08 KG/M2 | OXYGEN SATURATION: 96 % | RESPIRATION RATE: 18 BRPM

## 2024-03-05 DIAGNOSIS — K50.118 CROHN'S DISEASE OF COLON WITH OTHER COMPLICATION (MULTI): Primary | ICD-10-CM

## 2024-03-05 PROCEDURE — 3078F DIAST BP <80 MM HG: CPT | Performed by: INTERNAL MEDICINE

## 2024-03-05 PROCEDURE — 3075F SYST BP GE 130 - 139MM HG: CPT | Performed by: INTERNAL MEDICINE

## 2024-03-05 PROCEDURE — 99214 OFFICE O/P EST MOD 30 MIN: CPT | Performed by: INTERNAL MEDICINE

## 2024-03-05 RX ORDER — OMEGA-3S/DHA/EPA/FISH OIL 1000-1400
CAPSULE,DELAYED RELEASE (ENTERIC COATED) ORAL
COMMUNITY

## 2024-03-05 RX ORDER — PREDNISONE 20 MG/1
TABLET ORAL
Qty: 50 TABLET | Refills: 12 | Status: SHIPPED | OUTPATIENT
Start: 2024-03-05 | End: 2024-04-01

## 2024-03-05 RX ORDER — SODIUM, POTASSIUM,MAG SULFATES 17.5-3.13G
1 SOLUTION, RECONSTITUTED, ORAL ORAL 2 TIMES DAILY
Qty: 1 EACH | Refills: 0 | Status: SHIPPED | OUTPATIENT
Start: 2024-03-05 | End: 2024-03-06

## 2024-03-05 ASSESSMENT — ENCOUNTER SYMPTOMS
SHORTNESS OF BREATH: 0
CONSTIPATION: 0
DECREASED APPETITE: 0
HEMATEMESIS: 0
WEIGHT LOSS: 0
DIARRHEA: 0
VOMITING: 0
FEVER: 0
ABDOMINAL PAIN: 1
COUGH: 0
HEARTBURN: 0
NAUSEA: 0
HEMATOCHEZIA: 1

## 2024-03-05 NOTE — PROGRESS NOTES
REASON FOR VISIT:  Crohn's    HPI:  Colby Cordero III is a 58 y.o. male who presents for follow up     Pt with hx of Crohn's diagnosed in 2011 s/p sigmoid resection   Currently on stelara q 8 weeks - previously Humira, Remicade   due to inject 03/12/2024  Currently moving bowels 3-4x per day with urgency ,  small and loose , seeing BRB with mostly the first two bm's  -taking fiber gummies   Reports lower abdominal pain after meals   The patient denies any skin rashes , mucous membrane ulcerations or eye pain / visual disturbances.  Joint pain has improved with reducing hours at work   weight stable         Med list notable for    Labs notable for    No fhx of CRC.       Prev endoscopic eval:   Colonoscopy - 12/16/2021 - Dr Muniz      Impression:            - The examined portion of the ileum was normal.                         - Patent end-to-end colo-colonic anastomosis,                          characterized by healthy appearing mucosa.                         - Pseudopolyps in the descending colon.                         - Simple Endoscopic Score for Crohn's Disease: 0,                          mucosal inflammatory changes. Biopsied.                         - One 3 mm polyp in the descending colon, removed with                          a cold biopsy forceps. Resected and retrieved.                         - Diverticulosis in the entire examined colon.                         - External hemorrhoids.     Repeat in 3 years for surveillance - December 2024      Biopsy Results -  Left colon - Tubular Adenoma.  Biopsies negative for granulomas or dysplasia       REVIEW OF SYSTEMS    Review of Systems   Constitutional: Negative for decreased appetite, fever and weight loss.   Respiratory:  Negative for cough and shortness of breath.    Musculoskeletal:  Positive for joint pain.   Gastrointestinal:  Positive for abdominal pain and hematochezia. Negative for constipation, diarrhea, dysphagia, heartburn, hematemesis,  hemorrhoids, melena, nausea and vomiting.          Allergies   Allergen Reactions    Cephalexin Hives    Atorvastatin Myalgia     Joint pain and fatigue       Past Medical History:   Diagnosis Date    Chronic otitis media 10/09/2023    CKD (chronic kidney disease)     stage 3    Crohn's disease, unspecified, without complications (CMS/[facility]) 08/24/2020    Crohn's disease    Hemorrhage of anus and rectum     Hemorrhage of rectum and anus    Hemorrhage of rectum and anus 01/23/2024    Hyperlipidemia, unspecified     Dyslipidemia    Hypertension     Low back pain 02/19/2024    Otitis media, unspecified, right ear 01/04/2018    Acute otitis media, right    Pain of foot 02/19/2024    Pain of plantar aspect of heel 02/19/2024    Personal history of other diseases of the circulatory system     History of hypertension    Personal history of other diseases of the nervous system and sense organs     History of sleep apnea    Personal history of other diseases of the respiratory system 12/08/2018    History of upper respiratory infection    Personal history of other diseases of the respiratory system     History of bronchitis    Rash 02/19/2024    Comment on above: PERINEAL AREA;    Rash and other nonspecific skin eruption     Rash    Rupture of Achilles tendon 02/19/2024    Sleep apnea     Strain of lumbar region 02/19/2024    Unspecified abdominal pain 08/24/2020    Abdominal cramping    Upper respiratory tract infection 02/19/2024       Past Surgical History:   Procedure Laterality Date    COLONOSCOPY  08/24/2020    Complete Colonoscopy    HERNIA REPAIR  08/24/2020    Hernia Repair    OTHER SURGICAL HISTORY  08/24/2020    Carpal tunnel surgery    OTHER SURGICAL HISTORY  08/24/2020    Tonsillectomy    OTHER SURGICAL HISTORY  08/24/2020    Adenoidectomy    OTHER SURGICAL HISTORY  08/25/2022    Colon surgery    OTHER SURGICAL HISTORY  08/25/2022    Colonoscopy    OTHER SURGICAL HISTORY  08/25/2022    Hernia repair    OTHER  SURGICAL HISTORY  08/25/2022    Small bowel resection    SMALL INTESTINE SURGERY  08/24/2020    Small Bowel Resection       Family History   Problem Relation Name Age of Onset    Stroke Mother      Hypertension Mother      Heart disease Father      Cancer Brother      Breast cancer Other Grandmother     Uterine cancer Other Grandmother        Social History     Tobacco Use    Smoking status: Former     Types: Cigarettes    Smokeless tobacco: Current   Substance Use Topics    Alcohol use: Yes     Alcohol/week: 4.0 standard drinks of alcohol     Types: 4 Standard drinks or equivalent per week     Comment: once a month       Current Outpatient Medications   Medication Sig Dispense Refill    amLODIPine (Norvasc) 10 mg tablet Take 1 tablet (10 mg) by mouth once daily. 90 tablet 3    cyanocobalamin, vitamin B-12, (Vitamin B-12) 1,000 mcg tablet extended release Take 1 tablet (1,000 mcg) by mouth once daily.      DULoxetine (Cymbalta) 30 mg DR capsule Take 1 capsule (30 mg) by mouth once daily. Do not crush or chew. 90 capsule 3    ergocalciferol (Vitamin D-2) 1.25 MG (13666 UT) capsule Take 1 capsule (1,250 mcg) by mouth once daily.      hydrocortisone (Anusol-HC) 25 mg suppository Insert 1 suppository (25 mg) into the rectum once daily at bedtime. Insert 1 suppository into the rectum at bedtime for up to 6 nights 12 suppository 0    metoprolol succinate XL (Toprol-XL) 100 mg 24 hr tablet Take 1 tablet (100 mg) by mouth once daily. 90 tablet 3    ustekinumab (Stelara) injection Inject 1 mL (90 mg) under the skin every 3 months.      valsartan (Diovan) 160 mg tablet Take 1 tablet (160 mg) by mouth once daily. 90 tablet 3     No current facility-administered medications for this visit.       PHYSICAL EXAM:  There were no vitals taken for this visit.     Physical Exam  Constitutional:       Comments: Awake   HENT:      Head: Normocephalic.   Cardiovascular:      Rate and Rhythm: Normal rate and regular rhythm.   Pulmonary:       Effort: Pulmonary effort is normal.      Breath sounds: Normal breath sounds.   Abdominal:      General: Bowel sounds are normal.      Palpations: Abdomen is soft.   Neurological:      Mental Status: He is alert.   Psychiatric:         Mood and Affect: Mood normal.          ASSESSMENT    58-year-old male diagnosed with segmental Crohn's disease in 2011.  He is status post sigmoid resection in 2011- he has previously failed Humira and Remicade and is now on Stelara injections.  He was doing well with his last colonoscopy in 2021 noting a Crohn's SES score of 0.  More recently he has been noticing increasing diarrhea and bright red blood per rectum.  He had had COVID 2 weeks prior to onset of symptoms.  He was given hydrocortisone suppositories with minimal improvement in the bleeding but no change in his diarrhea.  Fecal calprotectin was over 700 with a slight elevation in his CRP. I am concerned that he is having progressive disease and failing the Stelara.  I will start him on prednisone 40 mg daily for 7 days with a taper over 4 weeks.  He will scheduled for colonoscopy to assess disease and he will plan on the injection as scheduled on April 11 but we will do a drug level as well.  Based on how he does as well as how much active disease is going on we will decide on continuing the Stelara versus changing to maybe Skyrizi  He will follow-up with Rula Becerra CNP in 6 to 8 weeks    Evaluation requested by Dr. Denny Darby DO.   My final recommendations will be communicated back to the requesting physician by way of shared Medical record or letter to requesting physician via fax.      Attending Note:   I have personally performed a face to face assessment of this Patient, which included an interview, physical exam and Assessment and Plan.  I have reviewed and confirmed the history and key findings as documented by the Medical Assistant and edited as appropriate.     Signature: Judy Muniz MD    Date:  3/5/2024  Time: 3:07 PM

## 2024-03-22 ENCOUNTER — ANESTHESIA EVENT (OUTPATIENT)
Dept: GASTROENTEROLOGY | Facility: EXTERNAL LOCATION | Age: 59
End: 2024-03-22

## 2024-04-04 ENCOUNTER — ANESTHESIA (OUTPATIENT)
Dept: GASTROENTEROLOGY | Facility: EXTERNAL LOCATION | Age: 59
End: 2024-04-04

## 2024-04-04 ENCOUNTER — APPOINTMENT (OUTPATIENT)
Dept: GASTROENTEROLOGY | Facility: EXTERNAL LOCATION | Age: 59
End: 2024-04-04
Payer: COMMERCIAL

## 2024-04-04 ENCOUNTER — HOSPITAL ENCOUNTER (OUTPATIENT)
Dept: GASTROENTEROLOGY | Facility: EXTERNAL LOCATION | Age: 59
Discharge: HOME | End: 2024-04-04
Payer: COMMERCIAL

## 2024-04-04 VITALS
WEIGHT: 270 LBS | HEART RATE: 61 BPM | TEMPERATURE: 98.1 F | SYSTOLIC BLOOD PRESSURE: 124 MMHG | DIASTOLIC BLOOD PRESSURE: 76 MMHG | RESPIRATION RATE: 14 BRPM | OXYGEN SATURATION: 98 % | BODY MASS INDEX: 37.66 KG/M2

## 2024-04-04 DIAGNOSIS — K50.118 CROHN'S DISEASE OF COLON WITH OTHER COMPLICATION (MULTI): ICD-10-CM

## 2024-04-04 DIAGNOSIS — K50.10 CROHN'S DISEASE OF COLON WITHOUT COMPLICATION (MULTI): Primary | ICD-10-CM

## 2024-04-04 DIAGNOSIS — K50.10 CROHN'S DISEASE OF COLON WITHOUT COMPLICATION (MULTI): ICD-10-CM

## 2024-04-04 PROCEDURE — 88305 TISSUE EXAM BY PATHOLOGIST: CPT | Mod: TC,59,ELYLAB | Performed by: INTERNAL MEDICINE

## 2024-04-04 PROCEDURE — 88305 TISSUE EXAM BY PATHOLOGIST: CPT | Performed by: PATHOLOGY

## 2024-04-04 PROCEDURE — 45380 COLONOSCOPY AND BIOPSY: CPT | Performed by: INTERNAL MEDICINE

## 2024-04-04 RX ORDER — ONDANSETRON HYDROCHLORIDE 2 MG/ML
4 INJECTION, SOLUTION INTRAVENOUS ONCE AS NEEDED
Status: CANCELLED | OUTPATIENT
Start: 2024-04-04

## 2024-04-04 RX ORDER — PREDNISONE 10 MG/1
20 TABLET ORAL DAILY
Qty: 100 TABLET | Refills: 12 | Status: SHIPPED | OUTPATIENT
Start: 2024-04-04 | End: 2024-05-04

## 2024-04-04 RX ORDER — SODIUM CHLORIDE 9 MG/ML
20 INJECTION, SOLUTION INTRAVENOUS CONTINUOUS
Status: CANCELLED | OUTPATIENT
Start: 2024-04-04

## 2024-04-04 RX ORDER — LIDOCAINE HYDROCHLORIDE 20 MG/ML
INJECTION, SOLUTION INFILTRATION; PERINEURAL AS NEEDED
Status: DISCONTINUED | OUTPATIENT
Start: 2024-04-04 | End: 2024-04-04

## 2024-04-04 RX ORDER — SODIUM CHLORIDE 9 MG/ML
INJECTION, SOLUTION INTRAVENOUS CONTINUOUS PRN
Status: DISCONTINUED | OUTPATIENT
Start: 2024-04-04 | End: 2024-04-04

## 2024-04-04 RX ORDER — PROPOFOL 10 MG/ML
INJECTION, EMULSION INTRAVENOUS AS NEEDED
Status: DISCONTINUED | OUTPATIENT
Start: 2024-04-04 | End: 2024-04-04

## 2024-04-04 RX ADMIN — PROPOFOL 100 MG: 10 INJECTION, EMULSION INTRAVENOUS at 14:12

## 2024-04-04 RX ADMIN — PROPOFOL 20 MG: 10 INJECTION, EMULSION INTRAVENOUS at 14:25

## 2024-04-04 RX ADMIN — PROPOFOL 50 MG: 10 INJECTION, EMULSION INTRAVENOUS at 14:27

## 2024-04-04 RX ADMIN — SODIUM CHLORIDE: 9 INJECTION, SOLUTION INTRAVENOUS at 14:07

## 2024-04-04 RX ADMIN — PROPOFOL 50 MG: 10 INJECTION, EMULSION INTRAVENOUS at 14:21

## 2024-04-04 RX ADMIN — PROPOFOL 50 MG: 10 INJECTION, EMULSION INTRAVENOUS at 14:17

## 2024-04-04 RX ADMIN — SODIUM CHLORIDE: 9 INJECTION, SOLUTION INTRAVENOUS at 14:22

## 2024-04-04 RX ADMIN — LIDOCAINE HYDROCHLORIDE 3 ML: 20 INJECTION, SOLUTION INFILTRATION; PERINEURAL at 14:12

## 2024-04-04 SDOH — HEALTH STABILITY: MENTAL HEALTH: CURRENT SMOKER: 0

## 2024-04-04 ASSESSMENT — COLUMBIA-SUICIDE SEVERITY RATING SCALE - C-SSRS
6. HAVE YOU EVER DONE ANYTHING, STARTED TO DO ANYTHING, OR PREPARED TO DO ANYTHING TO END YOUR LIFE?: NO
1. IN THE PAST MONTH, HAVE YOU WISHED YOU WERE DEAD OR WISHED YOU COULD GO TO SLEEP AND NOT WAKE UP?: NO
2. HAVE YOU ACTUALLY HAD ANY THOUGHTS OF KILLING YOURSELF?: NO

## 2024-04-04 ASSESSMENT — PAIN SCALES - GENERAL
PAINLEVEL_OUTOF10: 0 - NO PAIN
PAINLEVEL_OUTOF10: 0 - NO PAIN
PAIN_LEVEL: 0
PAINLEVEL_OUTOF10: 0 - NO PAIN

## 2024-04-04 ASSESSMENT — PAIN - FUNCTIONAL ASSESSMENT
PAIN_FUNCTIONAL_ASSESSMENT: 0-10

## 2024-04-04 NOTE — DISCHARGE INSTRUCTIONS
Patient Instructions Post Endoscopy Procedure      The anesthetics, sedatives or narcotics which were given to you today will be acting in your body for the next 24 hours, so you might feel a little sleepy or groggy.  This feeling should slowly wear off. Carefully read and follow the instructions.     You received sedation today:  - Do not drive or operate any machinery or power tools of any kind.   - No alcoholic beverages today, not even beer or wine.  - Do not make any important decisions or sign any legal documents.  - No over the counter medications that contain alcohol or that may cause drowsiness.    While it is common to experience mild to moderate abdominal distention, gas, or belching after your procedure, if any of these symptoms occur following discharge from the GI Lab or within one week of having your procedure, call the [facility] to be advised whether a visit to your nearest Urgent Care or Emergency Department is indicated.  Take this paper with you if you go.   - If you develop an allergic reaction to the medications that were given during your procedure such as difficulty breathing, rash, hives, severe nausea, vomiting or lightheadedness.  - If you experience chest pain, shortness of breath, severe abdominal pain, fevers and chills.  -If you develop signs and symptoms of bleeding such as blood in your spit, if your stools turn black, tarry, or bloody  - If you have not urinated within 8 hours following your procedure.  - If your IV site becomes painful, red, inflamed, or looks infected.    If you received a biopsy/polypectomy the following instructions apply below:  __ Do not use non-steroidal medications or anti-coagulants for one week following your procedure. (Examples of these types of medications are: Advil, Arthrotec, Aleve, Coumadin, Ecotrin, Heparin, Ibuprofen, Indocin, Motrin, Naprosyn, Nuprin, Plavix, Vioxx, and Voltarin, or their generic forms.  This list is not  all-inclusive.  Check with your physician or pharmacist before resuming medications.)   __ Eat a soft diet today.  Avoid foods that are poorly digested for the next 24 hours.  These foods would include: nuts, beans, lettuce, red meats, and fried foods. Start with liquids and advance your diet as tolerated, gradually work up to eating solids.   __ You can restart your ASA tomorrow  __ You can resume your anticoagulation therapy -     Your physician recommends the additional following instructions:    -You have a contact number available for emergencies. The signs and symptoms of potential delayed complications were discussed with you. You may return to normal activities tomorrow.  -Resume your previous diet or other if specified.  -Continue your present medications.   -We are waiting for your pathology results, if applicable. The results will be available in [employer]. I will send you a message with any recommendations.  -The findings and recommendations have been discussed with you and/or family.  -Please see Medication Reconciliation Form for new medication/medications prescribed.     If you experience any problems or have any questions following discharge from the GI Lab, please call: [phone] from 7 am- 4:30 pm.  In the event of an emergency please go to the closest Emergency Department or call Dr. Muniz at [phone]

## 2024-04-04 NOTE — ANESTHESIA POSTPROCEDURE EVALUATION
Patient: Colby Cordero III    Procedure Summary       Date: 04/04/24 Room / Location: [facility]    Anesthesia Start: 1407 Anesthesia Stop: 1438    Procedure: COLONOSCOPY Diagnosis: Crohn's disease of colon with other complication (CMS/HCC)    Scheduled Providers: Judy MONDRAGON MD Responsible Provider: CLAY Mtz    Anesthesia Type: MAC ASA Status: 3            Anesthesia Type: MAC    Vitals Value Taken Time   /72 04/04/24 1438   Temp 36.5 04/04/24 1438   Pulse 72 04/04/24 1438   Resp 15 04/04/24 1438   SpO2 95 04/04/24 1438       Anesthesia Post Evaluation    Patient location during evaluation: bedside  Patient participation: complete - patient participated  Level of consciousness: awake  Pain score: 0  Pain management: adequate  Airway patency: patent  Cardiovascular status: acceptable  Respiratory status: acceptable  Hydration status: acceptable  Postoperative Nausea and Vomiting: none      There were no known notable events for this encounter.

## 2024-04-04 NOTE — ANESTHESIA PREPROCEDURE EVALUATION
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)   (+) Irritable bowel syndrome      Endocrine   (+) Obesity      Musculoskeletal   (+) Carpal tunnel syndrome on right   (+) Osteoarthritis of foot joint      HEENT   (+) Conductive hearing loss, bilateral   (+) Hearing loss of right ear due to cerumen impaction   (+) Mixed conductive and sensorineural hearing loss of both ears   (+) Mixed conductive and sensorineural hearing loss, unilateral, left ear with restricted hearing on the contralateral side   (+) Sensorineural hearing loss (SNHL) of left ear   (+) Sensorineural hearing loss (SNHL) of right ear with restricted hearing of left ear      ID   (+) Chronic suppurative otitis media of right ear       Clinical information reviewed:   Tobacco  Allergies  Meds   Med Hx  Surg Hx   Fam Hx  Soc Hx        NPO Detail:  NPO/Void Status  Carbohydrate Drink Given Prior to Surgery? : Y  Date of Last Liquid: 04/04/24  Time of Last Liquid: 0900  Date of Last Solid: 04/03/24  Time of Last Solid: 0900  Time of Last Void: 1326         Physical Exam    Airway  Mallampati: II  TM distance: >3 FB  Neck ROM: full     Cardiovascular - normal exam  Rhythm: regular  Rate: normal     Dental - normal exam       Pulmonary - normal exam  Breath sounds clear to auscultation     Abdominal - normal exam  (+) obese  Abdomen: soft       Other findings: Caps [id number]      Anesthesia Plan    History of general anesthesia?: yes  History of complications of general  anesthesia?: no    ASA 3     MAC     The patient is not a current smoker.  Education provided regarding risk of obstructive sleep apnea.  intravenous induction   Anesthetic plan and risks discussed with patient.    Plan discussed with CRNA.

## 2024-04-04 NOTE — H&P
Outpatient Hospital Procedure    Patient Profile-Procedures  Initial Info  Patient Demographics  Name Colby Cordero III  Date of Birth 1965  MRN [id number]  Address   [address] [id number] [address] [place] [address]    Primary Phone Number [phone]  Secondary Phone Number    Denny Rasheed    Procedures   Colonoscopy      Indication:  Crohns = on Stelara    Primary contact name and number   Extended Emergency Contact Information  Primary Emergency Contact: DAVID THORNE  Mobile Phone: [phone]  Relation: Spouse  Preferred language: English   needed? No  Secondary Emergency Contact: Mohinder Cordero  Mobile Phone: [phone]  Relation: Child  Preferred language: English   needed? No    General Health  Weight   Vitals:    04/04/24 1320   Weight: 122 kg (270 lb)     BMI Body mass index is 37.66 kg/m².    Allergies  Allergies   Allergen Reactions    Cephalexin Hives    Atorvastatin Myalgia     Joint pain and fatigue       Past Medical History   Past Medical History:   Diagnosis Date    Chronic otitis media 10/09/2023    CKD (chronic kidney disease)     stage 3    Crohn's disease, unspecified, without complications (CMS/[facility]) 08/24/2020    Crohn's disease    Hemorrhage of anus and rectum     Hemorrhage of rectum and anus    Hemorrhage of rectum and anus 01/23/2024    Hyperlipidemia, unspecified     Dyslipidemia    Hypertension     Low back pain 02/19/2024    Otitis media, unspecified, right ear 01/04/2018    Acute otitis media, right    Pain of foot 02/19/2024    Pain of plantar aspect of heel 02/19/2024    Personal history of other diseases of the circulatory system     History of hypertension    Personal history of other diseases of the nervous system and sense organs     History of sleep apnea    Personal history of other diseases of the respiratory system 12/08/2018    History of upper respiratory infection    Personal history of other diseases of the  respiratory system     History of bronchitis    Rash 02/19/2024    Comment on above: PERINEAL AREA;    Rash and other nonspecific skin eruption     Rash    Rupture of Achilles tendon 02/19/2024    Sleep apnea     Strain of lumbar region 02/19/2024    Unspecified abdominal pain 08/24/2020    Abdominal cramping    Upper respiratory tract infection 02/19/2024       Provider assessment  Diagnosis  Medication Reviewed - yes  Prior to Admission medications    Medication Sig Start Date End Date Taking? Authorizing Provider   amLODIPine (Norvasc) 10 mg tablet Take 1 tablet (10 mg) by mouth once daily. 1/5/24 1/4/25 Yes Denny Darby DO   cyanocobalamin, vitamin B-12, (Vitamin B-12) 1,000 mcg tablet extended release Take 1 tablet (1,000 mcg) by mouth once daily. 6/9/23  Yes Historical Provider, MD   DULoxetine (Cymbalta) 30 mg DR capsule Take 1 capsule (30 mg) by mouth once daily. Do not crush or chew. 1/5/24 1/4/25 Yes Denny Darby DO   ergocalciferol (Vitamin D-2) 1.25 MG (45551 UT) capsule Take 1 capsule (1,250 mcg) by mouth once daily. 4/18/19  Yes Historical Provider, MD   metoprolol succinate XL (Toprol-XL) 100 mg 24 hr tablet Take 1 tablet (100 mg) by mouth once daily. 1/5/24 1/4/25 Yes Denny Darby DO   valsartan (Diovan) 160 mg tablet Take 1 tablet (160 mg) by mouth once daily. 1/5/24 1/4/25 Yes Denny Darby DO   hydrocortisone (Anusol-HC) 25 mg suppository Insert 1 suppository (25 mg) into the rectum once daily at bedtime. Insert 1 suppository into the rectum at bedtime for up to 6 nights  Patient not taking: Reported on 3/5/2024 1/23/24   Rula Becerra APRN-CNP   inulin (Fiber Gummies) 2 gram tablet,chewable Chew.    Historical Provider, MD   predniSONE (Deltasone) 20 mg tablet Take 2 tablets (40 mg) by mouth once daily for 7 days, THEN 1.5 tablets (30 mg) once daily for 7 days, THEN 1 tablet (20 mg) once daily for 7 days, THEN 0.5 tablets (10 mg) once daily for 7 days. Take by mouth as  directed. 3/5/24 4/1/24  Judy MONDRAGON MD   ustekinumab (Stelara) injection Inject 1 mL (90 mg) under the skin every 8 (eight) weeks.    Historical Provider, MD       This is my H&P    Physical Exam  Physical Exam  Constitutional:       Comments: Awake   HENT:      Head: Normocephalic.   Cardiovascular:      Rate and Rhythm: Normal rate and regular rhythm.   Pulmonary:      Effort: Pulmonary effort is normal.      Breath sounds: Normal breath sounds.   Abdominal:      General: Bowel sounds are normal.      Palpations: Abdomen is soft.   Neurological:      Mental Status: He is alert.   Psychiatric:         Mood and Affect: Mood normal.           Oropharyngeal Classification II (hard and soft palate, upper portion of tonsils anduvula visible)  ASA PS Classification 3  Sedation Plan Deep  Procedure Plan - pre-procedural (re)assesment completed by physician:  discharge/transfer patient when discharge criteria met    Judy Muniz MD  4/4/2024 2:09 PM

## 2024-04-05 ENCOUNTER — TELEPHONE (OUTPATIENT)
Dept: GASTROENTEROLOGY | Facility: CLINIC | Age: 59
End: 2024-04-05
Payer: COMMERCIAL

## 2024-04-05 DIAGNOSIS — K50.111 CROHN'S DISEASE OF COLON WITH RECTAL BLEEDING (MULTI): Primary | ICD-10-CM

## 2024-04-05 RX ORDER — ALBUTEROL SULFATE 0.83 MG/ML
3 SOLUTION RESPIRATORY (INHALATION) AS NEEDED
Status: CANCELLED | OUTPATIENT
Start: 2024-04-05

## 2024-04-05 RX ORDER — EPINEPHRINE 0.3 MG/.3ML
0.3 INJECTION SUBCUTANEOUS EVERY 5 MIN PRN
Status: CANCELLED | OUTPATIENT
Start: 2024-04-05

## 2024-04-05 RX ORDER — FAMOTIDINE 10 MG/ML
20 INJECTION INTRAVENOUS ONCE AS NEEDED
Status: CANCELLED | OUTPATIENT
Start: 2024-04-05

## 2024-04-05 RX ORDER — DIPHENHYDRAMINE HYDROCHLORIDE 50 MG/ML
50 INJECTION INTRAMUSCULAR; INTRAVENOUS AS NEEDED
Status: CANCELLED | OUTPATIENT
Start: 2024-04-05

## 2024-04-05 NOTE — TELEPHONE ENCOUNTER
----- Message from Judy MONDRAGON MD sent at 4/4/2024  3:00 PM EDT -----  He has active disease again - failing stelara - can you apply for skyrizi and schedule him an appt with me on a Wednesday in a few weeks - I am going to start him back on steroids until we transition

## 2024-04-05 NOTE — TELEPHONE ENCOUNTER
Discussed Skyrizi induction and maintenance therapy. 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 He would like induction doses at the [facility] center . He was set up for a follow up apt with Dr. Muniz on 4/24 .

## 2024-04-06 ENCOUNTER — DOCUMENTATION (OUTPATIENT)
Dept: INFUSION THERAPY | Facility: CLINIC | Age: 59
End: 2024-04-06
Payer: COMMERCIAL

## 2024-04-06 NOTE — PROGRESS NOTES
"Patient to be scheduled forNew Start  of Skyrizi   For Diagnosis: Crohn Disease     Dosed: Induction    Labs…  CBC-D Drawn:    Lab Results   Component Value Date    WBC 7.8 01/23/2024    HGB 14.1 01/23/2024    HCT 41.4 01/23/2024    MCV 87 01/23/2024     01/23/2024    (baseline)  CMP Drawn:     Chemistry    Lab Results   Component Value Date/Time     01/23/2024 1447    K 4.4 01/23/2024 1447     01/23/2024 1447    CO2 26 01/23/2024 1447    BUN 18 01/23/2024 1447    CREATININE 1.61 (H) 01/23/2024 1447    Lab Results   Component Value Date/Time    CALCIUM 9.4 01/23/2024 1447    ALKPHOS 64 01/23/2024 1447    AST 22 01/23/2024 1447    ALT 22 01/23/2024 1447    BILITOT 0.5 01/23/2024 1447          (baseline)  Liver Enzymes / Bilirubin Drawn/Results:   Lab Results   Component Value Date    ALT 22 01/23/2024    AST 22 01/23/2024    ALKPHOS 64 01/23/2024    BILITOT 0.5 01/23/2024     (baseline and during treatment)    AST: 22 (liver injury = 30 × ULN)   ALT:   Lab Results   Component Value Date    ALT 22 01/23/2024    (liver injury =  54 × ULN)   Total Bilirubin:   Lab Results   Component Value Date    ALT 22 01/23/2024    AST 22 01/23/2024    ALKPHOS 64 01/23/2024    BILITOT 0.5 01/23/2024     (liver injury =  2.2 × ULN)  TB Drawn/Results: Patient was positive in the past and has completed INH therapy in 2015, no further TB tests have been performed.  Hep B / Hep C Drawn/Results:   Lab Results   Component Value Date    HEPCAB NON-REACTIVE 04/17/2019    No results found for: \"HAGCN\", \"HEPBSURABI\", \"HBSAG\", \"XHAGF\", \"HEPBSAG\", \"EXTHEPBSAG\", \"NONUHSWGH\"     Last infusion/injection received: N/A (if continuation)    Due: Patient will be called to scheduled once prior authorization is processed.     This result meets treatment criteria.    "

## 2024-04-08 ENCOUNTER — SPECIALTY PHARMACY (OUTPATIENT)
Dept: PHARMACY | Facility: CLINIC | Age: 59
End: 2024-04-08

## 2024-04-11 ENCOUNTER — LAB (OUTPATIENT)
Dept: LAB | Facility: LAB | Age: 59
End: 2024-04-11
Payer: COMMERCIAL

## 2024-04-11 DIAGNOSIS — K50.118 CROHN'S DISEASE OF COLON WITH OTHER COMPLICATION (MULTI): ICD-10-CM

## 2024-04-11 LAB
LABORATORY COMMENT REPORT: NORMAL
PATH REPORT.FINAL DX SPEC: NORMAL
PATH REPORT.GROSS SPEC: NORMAL
PATH REPORT.TOTAL CANCER: NORMAL

## 2024-04-11 PROCEDURE — 36415 COLL VENOUS BLD VENIPUNCTURE: CPT

## 2024-04-18 LAB — SCAN RESULT: NORMAL

## 2024-04-24 ENCOUNTER — OFFICE VISIT (OUTPATIENT)
Dept: GASTROENTEROLOGY | Facility: CLINIC | Age: 59
End: 2024-04-24
Payer: COMMERCIAL

## 2024-04-24 VITALS
OXYGEN SATURATION: 95 % | SYSTOLIC BLOOD PRESSURE: 118 MMHG | TEMPERATURE: 97.1 F | WEIGHT: 270 LBS | HEART RATE: 56 BPM | DIASTOLIC BLOOD PRESSURE: 61 MMHG | HEIGHT: 71 IN | RESPIRATION RATE: 18 BRPM | BODY MASS INDEX: 37.8 KG/M2

## 2024-04-24 DIAGNOSIS — K50.118 CROHN'S DISEASE OF COLON WITH OTHER COMPLICATION (MULTI): Primary | ICD-10-CM

## 2024-04-24 PROCEDURE — 3078F DIAST BP <80 MM HG: CPT | Performed by: INTERNAL MEDICINE

## 2024-04-24 PROCEDURE — 3074F SYST BP LT 130 MM HG: CPT | Performed by: INTERNAL MEDICINE

## 2024-04-24 PROCEDURE — 99214 OFFICE O/P EST MOD 30 MIN: CPT | Performed by: INTERNAL MEDICINE

## 2024-04-24 ASSESSMENT — ENCOUNTER SYMPTOMS
JAUNDICE: 0
WEIGHT LOSS: 0
FEVER: 0
COUGH: 0
HEMATOCHEZIA: 1
ABDOMINAL PAIN: 1
SHORTNESS OF BREATH: 0
CHILLS: 0
NAUSEA: 0
VOMITING: 0
HEMATEMESIS: 0
DECREASED APPETITE: 0
HEARTBURN: 0
DIARRHEA: 1

## 2024-04-24 NOTE — PROGRESS NOTES
REASON FOR VISIT:  Crohn's disease     HPI:  Colby Cordero III is a 58 y.o. male who presents for follow up     Pt with hx of Crohn's diagnosed in 2011 s/p sigmoid resection   Due for first Skyrizi infusion on 5/2- previously Humira, Remicade , stelara - last injected 4/12  Currently taking prednisone 20mg daily     Currently moving bowels 10x per day with urgency , reddish - brown watery , Reports lower abdominal pain   The patient denies any skin rashes , mucous membrane ulcerations or eye pain / visual disturbances.  weight stable            Med list notable for     Labs notable for    No fhx of CRC.       Prev endoscopic eval:   >> Colonoscopy 04/2024     REVIEW OF SYSTEMS    Review of Systems   Constitutional: Positive for malaise/fatigue. Negative for chills, decreased appetite, fever and weight loss.   Cardiovascular:  Negative for chest pain.   Respiratory:  Negative for cough and shortness of breath.    Gastrointestinal:  Positive for abdominal pain, diarrhea and hematochezia. Negative for dysphagia, heartburn, hematemesis, hemorrhoids, jaundice, melena, nausea and vomiting.          Allergies   Allergen Reactions    Cephalexin Hives    Atorvastatin Myalgia     Joint pain and fatigue       Past Medical History:   Diagnosis Date    Chronic otitis media 10/09/2023    CKD (chronic kidney disease)     stage 3    Crohn's disease, unspecified, without complications (Multi) 08/24/2020    Crohn's disease    Hemorrhage of anus and rectum     Hemorrhage of rectum and anus    Hemorrhage of rectum and anus 01/23/2024    Hyperlipidemia, unspecified     Dyslipidemia    Hypertension     Low back pain 02/19/2024    Otitis media, unspecified, right ear 01/04/2018    Acute otitis media, right    Pain of foot 02/19/2024    Pain of plantar aspect of heel 02/19/2024    Personal history of other diseases of the circulatory system     History of hypertension    Personal history of other diseases of the nervous system and sense  organs     History of sleep apnea    Personal history of other diseases of the respiratory system 12/08/2018    History of upper respiratory infection    Personal history of other diseases of the respiratory system     History of bronchitis    Rash 02/19/2024    Comment on above: PERINEAL AREA;    Rash and other nonspecific skin eruption     Rash    Rupture of Achilles tendon 02/19/2024    Sleep apnea     Strain of lumbar region 02/19/2024    Unspecified abdominal pain 08/24/2020    Abdominal cramping    Upper respiratory tract infection 02/19/2024       Past Surgical History:   Procedure Laterality Date    COLONOSCOPY  08/24/2020    Complete Colonoscopy    HERNIA REPAIR  08/24/2020    Hernia Repair    OTHER SURGICAL HISTORY  08/24/2020    Carpal tunnel surgery    OTHER SURGICAL HISTORY  08/24/2020    Tonsillectomy    OTHER SURGICAL HISTORY  08/24/2020    Adenoidectomy    OTHER SURGICAL HISTORY  08/25/2022    Colon surgery    OTHER SURGICAL HISTORY  08/25/2022    Colonoscopy    OTHER SURGICAL HISTORY  08/25/2022    Hernia repair    OTHER SURGICAL HISTORY  08/25/2022    Small bowel resection    SMALL INTESTINE SURGERY  08/24/2020    Small Bowel Resection       Family History   Problem Relation Name Age of Onset    Stroke Mother      Hypertension Mother      Heart disease Father      Cancer Brother      Breast cancer Other Grandmother     Uterine cancer Other Grandmother        Social History     Tobacco Use    Smoking status: Every Day     Types: Cigarettes    Smokeless tobacco: Current    Tobacco comments:     Vape    Substance Use Topics    Alcohol use: Yes     Alcohol/week: 4.0 standard drinks of alcohol     Types: 4 Standard drinks or equivalent per week     Comment: occasional       Current Outpatient Medications   Medication Sig Dispense Refill    amLODIPine (Norvasc) 10 mg tablet Take 1 tablet (10 mg) by mouth once daily. 90 tablet 3    cyanocobalamin, vitamin B-12, (Vitamin B-12) 1,000 mcg tablet extended  release Take 1 tablet (1,000 mcg) by mouth once daily.      DULoxetine (Cymbalta) 30 mg DR capsule Take 1 capsule (30 mg) by mouth once daily. Do not crush or chew. 90 capsule 3    ergocalciferol (Vitamin D-2) 1.25 MG (74228 UT) capsule Take 1 capsule (1,250 mcg) by mouth once daily.      hydrocortisone (Anusol-HC) 25 mg suppository Insert 1 suppository (25 mg) into the rectum once daily at bedtime. Insert 1 suppository into the rectum at bedtime for up to 6 nights (Patient not taking: Reported on 3/5/2024) 12 suppository 0    inulin (Fiber Gummies) 2 gram tablet,chewable Chew.      metoprolol succinate XL (Toprol-XL) 100 mg 24 hr tablet Take 1 tablet (100 mg) by mouth once daily. 90 tablet 3    predniSONE (Deltasone) 10 mg tablet Take 2 tablets (20 mg) by mouth once daily. Take by mouth as directed 100 tablet 12    risankizumab-rzaa (Skyrizi) 360 mg/2.4 mL (150 mg/mL) wearable injector injection Inject 2.4 mL (360 mg) under the skin every 8 (eight) weeks. Begin after induction doses at week 12 , and every 8 weeks thereafter 2.4 mL 3    risankizumab-rzaa (Skyrizi) 60 mg/mL solution injection Infuse 10 mL (600 mg total) into a venous catheter every 28 (twenty-eight) days.  at week 0, 4 and 8 10 mL 2    ustekinumab (Stelara) injection Inject 1 mL (90 mg) under the skin every 8 (eight) weeks.      valsartan (Diovan) 160 mg tablet Take 1 tablet (160 mg) by mouth once daily. 90 tablet 3     No current facility-administered medications for this visit.       PHYSICAL EXAM:  There were no vitals taken for this visit.     Physical Exam  Constitutional:       Comments: Awake   HENT:      Head: Normocephalic.   Cardiovascular:      Rate and Rhythm: Normal rate and regular rhythm.   Pulmonary:      Effort: Pulmonary effort is normal.      Breath sounds: Normal breath sounds.   Abdominal:      General: Bowel sounds are normal.      Palpations: Abdomen is soft.   Neurological:      Mental Status: He is alert.   Psychiatric:          Mood and Affect: Mood normal.        ASSESSMENT  58-year-old male diagnosed with segmental Crohn's disease in 2011 status post sigmoid resection at that time.  He has previously failed Humira and Remicade and has been on Stelara injections since 2020.  His last colonoscopy in 2021 noted a Crohn's SES score of 0.  He started noticing increasing diarrhea abdominal pain and bright red blood per rectum after having COVID in March of this year.  Fecal calprotectin was elevated at 700 slight elevation of CRP and his colonoscopy identified active disease in his sigmoid and descending colon with an SES score of 13.  Stelara drug level was 0.4 which was previously 13.  We decided to switch him to Skyrizi -he is scheduled to receive next week.  He is currently on prednisone 20 mg since his last visit in March.  He will decrease and taper to 15 mg after he receives his infusion and then decrease by 5 mg every 2 weeks  We will see him in follow-up in 3 months and we will plan for repeat colonoscopy depending on how he does before the end of the year      Evaluation requested by Dr. Denny Darby DO.   My final recommendations will be communicated back to the requesting physician by way of shared Medical record or letter to requesting physician via fax.      Attending Note:   I have personally performed a face to face assessment of this Patient, which included an interview, physical exam and Assessment and Plan.  I have reviewed and confirmed the history and key findings as documented by the Medical Assistant and edited as appropriate.     Signature: Judy Muniz MD    Date: 4/24/2024  Time: 2:04 PM

## 2024-05-02 ENCOUNTER — INFUSION (OUTPATIENT)
Dept: INFUSION THERAPY | Facility: CLINIC | Age: 59
End: 2024-05-02
Payer: COMMERCIAL

## 2024-05-02 VITALS
OXYGEN SATURATION: 97 % | DIASTOLIC BLOOD PRESSURE: 72 MMHG | SYSTOLIC BLOOD PRESSURE: 114 MMHG | HEART RATE: 79 BPM | RESPIRATION RATE: 18 BRPM | TEMPERATURE: 97.1 F

## 2024-05-02 DIAGNOSIS — K50.111 CROHN'S DISEASE OF COLON WITH RECTAL BLEEDING (MULTI): ICD-10-CM

## 2024-05-02 PROCEDURE — 96365 THER/PROPH/DIAG IV INF INIT: CPT | Performed by: REGISTERED NURSE

## 2024-05-02 RX ORDER — EPINEPHRINE 0.3 MG/.3ML
0.3 INJECTION SUBCUTANEOUS EVERY 5 MIN PRN
Status: CANCELLED | OUTPATIENT
Start: 2024-05-30

## 2024-05-02 RX ORDER — FAMOTIDINE 10 MG/ML
20 INJECTION INTRAVENOUS ONCE AS NEEDED
Status: CANCELLED | OUTPATIENT
Start: 2024-05-30

## 2024-05-02 RX ORDER — DIPHENHYDRAMINE HYDROCHLORIDE 50 MG/ML
50 INJECTION INTRAMUSCULAR; INTRAVENOUS AS NEEDED
Status: CANCELLED | OUTPATIENT
Start: 2024-05-30

## 2024-05-02 RX ORDER — ALBUTEROL SULFATE 0.83 MG/ML
3 SOLUTION RESPIRATORY (INHALATION) AS NEEDED
Status: CANCELLED | OUTPATIENT
Start: 2024-05-30

## 2024-05-02 ASSESSMENT — ENCOUNTER SYMPTOMS
CONSTITUTIONAL NEGATIVE: 1
ENDOCRINE NEGATIVE: 1
PSYCHIATRIC NEGATIVE: 1
NEUROLOGICAL NEGATIVE: 1
EYES NEGATIVE: 1
GASTROINTESTINAL NEGATIVE: 1
CARDIOVASCULAR NEGATIVE: 1
MUSCULOSKELETAL NEGATIVE: 1
RESPIRATORY NEGATIVE: 1
HEMATOLOGIC/LYMPHATIC NEGATIVE: 1

## 2024-05-02 NOTE — PATIENT INSTRUCTIONS
Today you received:  SKYRIZI 600MG INFUSION 1ST DOSE    For:    1. Crohn's disease of colon with rectal bleeding (Multi)         Please read the  Medication Guide that was given to you and reviewed during todays visit.     (Tell all doctors including dentists that you are taking this medication)     Go to the emergency room or call 911 if:  -You have signs of allergic reaction:   o         Rash, hives, itching.   o         Swollen, blistered, peeling skin.   o         Swelling of face, lips, mouth, tongue or throat.   o         Tightness of chest, trouble breathing, swallowing or talking      Call your doctor:     - If IV / injection site gets red, warm, swollen, itchy or leaks fluid or pus.     (Leave dressing on your IV site for at least 2 hours and keep area clean and dry    - If you get sick or have symptoms of infection or are not feeling well for any reason.    (Wash your hands often, stay away from people who are sick)    - If you have side effects from your medication that do not go away or are bothersome.     (Refer to the teaching your nurse gave you for side effects to call your doctor about)     Common side effects may include:  stuffy nose, headache, feeling tired, muscle aches, upset stomach    - Before receiving any vaccines, Call the [facility] at [phone]     - You get sick, are on antibiotics, have had a recent vaccine, have surgery or dental work and your doctor wants your visit rescheduled.    - You need to cancel and reschedule your visit for any reason. Call at least 2 days before your visit if you need to cancel.     - Your insurance changes before your next visit.    (We will need to get approval from your new insurance. This can take up to two weeks.)     [facility] is opened Monday thru Friday 8am-8pm and Saturday 8am-4:30pm. We are closed on holidays.     Voice mail will take your call if the center is closed. If you leave a message please allow  24 hours for a call back during weekdays. If you leave a message on a weekend/holiday, we will call you back the next business day.

## 2024-05-02 NOTE — PROGRESS NOTES
[facility]   Infusion Clinic Note   Date: May 2, 2024   Name: Colby Cordero III  : 1965   MRN: [id number]         Reason for Visit: OP Infusion (PATIENT HERE FOR HIS SKYRIZI 600MG INFUSION 1ST DOSE)         Visit Type: INFUSION       Ordered By: PARAS MONDRAGON MD      Accompanied by:Self      Diagnosis: Crohn's disease of colon with rectal bleeding (Multi)       Allergies:   Allergies as of 2024 - Reviewed 2024   Allergen Reaction Noted    Cephalexin Hives 10/09/2023    Atorvastatin Myalgia 10/09/2023         Current Medications has a current medication list which includes the following prescription(s): amlodipine, cyanocobalamin (vitamin b-12), duloxetine, ergocalciferol, hydrocortisone, fiber gummies, metoprolol succinate xl, prednisone, risankizumab-rzaa, risankizumab-rzaa, stelara, and valsartan.       Vitals:   Vitals:    24 1345 24 1501   BP: 123/77 114/72   Pulse: 61 79   Resp: 18 18   Temp: 36.2 °C (97.1 °F)    SpO2: 97% 97%             Infusion Pre-procedure Checklist:   - Allergies reviewed: yes   - Medications reviewed: yes       - Previous reaction to current treatment: no      Assess patient for the concerns below. Document provider notification as appropriate.  - Active or recent infection with/without current antibiotic use: no  - Recent or planned invasive dental work: no  - Recent or planned surgeries: no  - Recently received or plans to receive vaccinations: no  - Has treatment related toxicities: no  - Is pregnant:  n/a      Pain: 0   - Is the pain different from normal: n/a   - Is the pain tolerable: n/a   - Is your Doctor aware:  n/a      Labs: N/A         Fall Risk Screening: Candelario Fall Risk  History of Falling, Immediate or Within 3 Months: No  Secondary Diagnosis: No  Ambulatory Aid: Walks without aid/bedrest/nurse assist  Intravenous Therapy/Heparin Lock: No  Gait/Transferring: Normal/bedrest/immobile  Mental Status: Oriented to own  ability  Palomino Fall Risk Score: 0       Review Of Systems:  Review of Systems   Constitutional: Negative.    HENT:  Negative.     Eyes: Negative.    Respiratory: Negative.     Cardiovascular: Negative.    Gastrointestinal: Negative.    Endocrine: Negative.    Genitourinary: Negative.     Musculoskeletal: Negative.    Skin: Negative.    Neurological: Negative.    Hematological: Negative.    Psychiatric/Behavioral: Negative.           Infusion Readiness:   - Assessment Concerns Related to Infusion: No  - Provider notified: n/a      Document Below Only If Indicated:   New Patient Education:            Treatment Conditions & Drug Specific Questions:            Weight Based Drug Calculations:    WEIGHT BASED DRUGS: NOT APPLICABLE / FLAT DOSE          Initiated By: Dalton Justice RN   Time: 3:18 PM     We administered risankizumab-rzaa (Skyrizi) 600 mg in dextrose 5 % in water (D5W) 260 mL IV.

## 2024-05-06 DIAGNOSIS — K50.118 CROHN'S DISEASE OF COLON WITH OTHER COMPLICATION (MULTI): ICD-10-CM

## 2024-05-23 ENCOUNTER — APPOINTMENT (OUTPATIENT)
Dept: INFUSION THERAPY | Facility: CLINIC | Age: 59
End: 2024-05-23
Payer: COMMERCIAL

## 2024-05-30 ENCOUNTER — INFUSION (OUTPATIENT)
Dept: INFUSION THERAPY | Facility: CLINIC | Age: 59
End: 2024-05-30
Payer: COMMERCIAL

## 2024-05-30 VITALS
SYSTOLIC BLOOD PRESSURE: 134 MMHG | TEMPERATURE: 97.8 F | RESPIRATION RATE: 18 BRPM | OXYGEN SATURATION: 97 % | HEART RATE: 60 BPM | DIASTOLIC BLOOD PRESSURE: 79 MMHG

## 2024-05-30 DIAGNOSIS — K50.111 CROHN'S DISEASE OF COLON WITH RECTAL BLEEDING (MULTI): ICD-10-CM

## 2024-05-30 PROCEDURE — 96365 THER/PROPH/DIAG IV INF INIT: CPT | Performed by: REGISTERED NURSE

## 2024-05-30 RX ORDER — FAMOTIDINE 10 MG/ML
20 INJECTION INTRAVENOUS ONCE AS NEEDED
OUTPATIENT
Start: 2024-06-27

## 2024-05-30 RX ORDER — DIPHENHYDRAMINE HYDROCHLORIDE 50 MG/ML
50 INJECTION INTRAMUSCULAR; INTRAVENOUS AS NEEDED
OUTPATIENT
Start: 2024-06-27

## 2024-05-30 RX ORDER — ALBUTEROL SULFATE 0.83 MG/ML
3 SOLUTION RESPIRATORY (INHALATION) AS NEEDED
OUTPATIENT
Start: 2024-06-27

## 2024-05-30 RX ORDER — EPINEPHRINE 0.3 MG/.3ML
0.3 INJECTION SUBCUTANEOUS EVERY 5 MIN PRN
OUTPATIENT
Start: 2024-06-27

## 2024-05-30 ASSESSMENT — ENCOUNTER SYMPTOMS
ABDOMINAL DISTENTION: 1
ABDOMINAL PAIN: 1
NEUROLOGICAL NEGATIVE: 1
CARDIOVASCULAR NEGATIVE: 1
CONSTITUTIONAL NEGATIVE: 1
DIARRHEA: 1
BLOOD IN STOOL: 1
MUSCULOSKELETAL NEGATIVE: 1
RESPIRATORY NEGATIVE: 1

## 2024-05-30 NOTE — PROGRESS NOTES
[facility]   Infusion Clinic Note   Date: May 30, 2024   Name: Colby Cordero III  : 1965   MRN: [id number]         Reason for Visit: OP Infusion (PATIENT HERE FOR WEEK 4 OF SKYRIZI 600 MG INFUSION)      Accompanied by:Self   Visit Type:: Infusion   Diagnosis: Crohn's disease of colon with rectal bleeding (Multi)    Allergies:   Allergies as of 2024 - Reviewed 2024   Allergen Reaction Noted   • Cephalexin Hives 10/09/2023   • Atorvastatin Myalgia 10/09/2023      Current Meds has a current medication list which includes the following prescription(s): amlodipine, cyanocobalamin (vitamin b-12), duloxetine, ergocalciferol, hydrocortisone, fiber gummies, metoprolol succinate xl, risankizumab-rzaa, risankizumab-rzaa, stelara, and valsartan, and the following Facility-Administered Medications: risankizumab-rzaa (Skyrizi) 600 mg in dextrose 5% 260 mL IV.        Vitals:  Vitals:    24 1049   BP: 134/79   Pulse: 60   Resp: 18   Temp: 36.6 °C (97.8 °F)   SpO2: 97%      Infusion Pre-procedure Checklist   Allergies reviewed: yes   Medications reviewed: yes   Contraindications to treatment:No   Previous reaction to current treatment:No   Current Health Issues: None   Pain: ' 0   Is the pain different from normal: No   Is the pain tolerable: n/a   Is your Doctor aware: n/a   Contraindications based on patient history: No   Provider notified: Not applicable   Labs: Labs reviewed   Fall Risk Screening: Palomino Fall Risk  History of Falling, Immediate or Within 3 Months: No  Secondary Diagnosis: No  Ambulatory Aid: Walks without aid/bedrest/nurse assist  Intravenous Therapy/Heparin Lock: No  Gait/Transferring: Normal/bedrest/immobile  Mental Status: Oriented to own ability  Palomino Fall Risk Score: 0    Review of Systems   Constitutional: Negative.    HENT:  Negative.     Respiratory: Negative.     Cardiovascular: Negative.    Gastrointestinal:  Positive for abdominal distention,  abdominal pain, blood in stool and diarrhea.        PATIENT STATES HE HAS 7-12 BOWEL MOVEMENTS /DAY   Musculoskeletal: Negative.    Neurological: Negative.       Negative for complaint: [] all other systems have been reviewed and are negative for complaint   Infusion Readiness:   Assessment Concerns Related to Infusion: No  Provider notified: n/a  Assess patient for the concerns below. Document provider notification as appropriate:  - Does not meet criteria to treat No  - Has an active or recent infection with/without current antibiotic use No  - Has recent/planned dental work No  - Has recent/planned surgeries No  - Has recently received or plans to receive vaccinations No  - Has treatment related toxicities No  - Is pregnant (unless noted otherwise) N/A    Initiated By: Brenda Roe RN   Time: 11:17 AM     We administered risankizumab-rzaa (Skyrizi) 600 mg in dextrose 5% 260 mL IV.

## 2024-05-30 NOTE — PATIENT INSTRUCTIONS
Today :We administered risankizumab-rzaa (Skyrizi) 600 mg in dextrose 5% 260 mL IV.     For:   1. Crohn's disease of colon with rectal bleeding (Multi)         Your next appointment is due in:  4 WEEKS        Please read the  Medication Guide that was given to you and reviewed during todays visit.     (Tell all doctors including dentists that you are taking this medication)     Go to the emergency room or call 911 if:  -You have signs of allergic reaction:   -Rash, hives, itching.   -Swollen, blistered, peeling skin.   -Swelling of face, lips, mouth, tongue or throat.   -Tightness of chest, trouble breathing, swallowing or talking     Call your doctor:  - If IV / injection site gets red, warm, swollen, itchy or leaks fluid or pus.     (Leave dressing on your IV site for at least 2 hours and keep area clean and dry  - If you get sick or have symptoms of infection or are not feeling well for any reason.    (Wash your hands often, stay away from people who are sick)  - If you have side effects from your medication that do not go away or are bothersome.     (Refer to the teaching your nurse gave you for side effects to call your doctor about)    - Common side effects may include:  stuffy nose, headache, feeling tired, muscle aches, upset stomach  - Before receiving any vaccines     - Call the [facility] at   If:  - You get sick, are on antibiotics, have had a recent vaccine, have surgery or dental work and your doctor wants your visit rescheduled.  - You need to cancel and reschedule your visit for any reason. Call at least 2 days before your visit if you need to cancel.   - Your insurance changes before your next visit.    (We will need to get approval from your new insurance. This can take up to two weeks.)     [facility] is opened Monday thru Friday. We are closed on weekends and holidays.   Voice mail will take your call if the center is closed. If you leave a message please  allow 24 hours for a call back during weekdays. If you leave a message on a weekend/holiday, we will call you back the next business day.

## 2024-05-31 DIAGNOSIS — I10 PRIMARY HYPERTENSION: ICD-10-CM

## 2024-06-03 RX ORDER — VALSARTAN 160 MG/1
160 TABLET ORAL DAILY
Qty: 90 TABLET | Refills: 1 | Status: SHIPPED | OUTPATIENT
Start: 2024-06-03 | End: 2024-11-30

## 2024-06-20 ENCOUNTER — APPOINTMENT (OUTPATIENT)
Dept: INFUSION THERAPY | Facility: CLINIC | Age: 59
End: 2024-06-20
Payer: COMMERCIAL

## 2024-06-27 ENCOUNTER — APPOINTMENT (OUTPATIENT)
Dept: INFUSION THERAPY | Facility: CLINIC | Age: 59
End: 2024-06-27
Payer: COMMERCIAL

## 2024-06-27 VITALS
RESPIRATION RATE: 18 BRPM | OXYGEN SATURATION: 100 % | SYSTOLIC BLOOD PRESSURE: 133 MMHG | HEART RATE: 55 BPM | TEMPERATURE: 97.6 F | DIASTOLIC BLOOD PRESSURE: 80 MMHG

## 2024-06-27 DIAGNOSIS — K50.111 CROHN'S DISEASE OF COLON WITH RECTAL BLEEDING (MULTI): ICD-10-CM

## 2024-06-27 PROCEDURE — 96365 THER/PROPH/DIAG IV INF INIT: CPT | Performed by: REGISTERED NURSE

## 2024-06-27 RX ORDER — FAMOTIDINE 10 MG/ML
20 INJECTION INTRAVENOUS ONCE AS NEEDED
OUTPATIENT
Start: 2024-07-25

## 2024-06-27 RX ORDER — EPINEPHRINE 0.3 MG/.3ML
0.3 INJECTION SUBCUTANEOUS EVERY 5 MIN PRN
OUTPATIENT
Start: 2024-07-25

## 2024-06-27 RX ORDER — ALBUTEROL SULFATE 0.83 MG/ML
3 SOLUTION RESPIRATORY (INHALATION) AS NEEDED
OUTPATIENT
Start: 2024-07-25

## 2024-06-27 RX ORDER — DIPHENHYDRAMINE HYDROCHLORIDE 50 MG/ML
50 INJECTION INTRAMUSCULAR; INTRAVENOUS AS NEEDED
OUTPATIENT
Start: 2024-07-25

## 2024-06-27 ASSESSMENT — ENCOUNTER SYMPTOMS
NAUSEA: 0
DIARRHEA: 0
BLOOD IN STOOL: 0
RECTAL PAIN: 0
VOMITING: 0
ABDOMINAL PAIN: 0
CONSTIPATION: 0
ABDOMINAL DISTENTION: 1

## 2024-06-27 NOTE — PROGRESS NOTES
[facility]   Infusion Clinic Note   Date: 2024   Name: Colby Cordero III  : 1965   MRN: [id number]         Reason for Visit: OP Infusion (SKYRIZI 600 MG)         Today: We administered risankizumab-rzaa (Skyrizi) 600 mg in dextrose 5% 260 mL IV.       Visit Type: INFUSION       Ordered By: PARAS MITCHELL      Accompanied by:Wife      Diagnosis: Crohn's disease of colon with rectal bleeding (Multi)       Allergies:   Allergies as of 2024 - Reviewed 2024   Allergen Reaction Noted    Cephalexin Hives 10/09/2023    Atorvastatin Myalgia 10/09/2023         Current Medications has a current medication list which includes the following prescription(s): amlodipine, cyanocobalamin (vitamin b-12), duloxetine, ergocalciferol, hydrocortisone, fiber gummies, metoprolol succinate xl, risankizumab-rzaa, risankizumab-rzaa, stelara, and valsartan, and the following Facility-Administered Medications: risankizumab-rzaa (Skyrizi) 600 mg in dextrose 5% 260 mL IV.       Vitals:   Vitals:    24 1106   BP: 133/80   Pulse: 55   Resp: 18   Temp: 36.4 °C (97.6 °F)   SpO2: 100%             Infusion Pre-procedure Checklist:   - Allergies reviewed: yes   - Medications reviewed: yes       - Previous reaction to current treatment: no      Assess patient for the concerns below. Document provider notification as appropriate.  - Active or recent infection with/without current antibiotic use: no  - Recent or planned invasive dental work: no  - Recent or planned surgeries: no  - Recently received or plans to receive vaccinations: no  - Has treatment related toxicities: no  - Is pregnant:  n/a      Pain: 0   - Is the pain different from normal: n/a   - Is the pain tolerable: n/a   - Is your Doctor aware:  n/a      Labs: N/A         Fall Risk Screening: Candelario Fall Risk  History of Falling, Immediate or Within 3 Months: No  Ambulatory Aid: Walks without aid/bedrest/nurse assist  Intravenous  Therapy/Heparin Lock: Yes  Gait/Transferring: Normal/bedrest/immobile  Mental Status: Oriented to own ability       Review Of Systems:  Review of Systems   Gastrointestinal:  Positive for abdominal distention. Negative for abdominal pain, blood in stool, constipation, diarrhea, nausea, rectal pain and vomiting.        BLOATING DISCOMFORT   All other systems reviewed and are negative.        Infusion Readiness:   - Assessment Concerns Related to Infusion: No  - Provider notified: no      Document Below Only If Indicated:   New Patient Education:    N/A (returning patient for continuation of therapy. Ongoing education provided as needed.)        Treatment Conditions & Drug Specific Questions:        Weight Based Drug Calculations:    WEIGHT BASED DRUGS: NOT APPLICABLE / FLAT DOSE          Initiated By: Kiera Draper RN          Patient completed his third infusion today and will be switching to injections.     All questions and concerns were addressed at time of visit. Patient was not independently evaluated by the Nurse Practitioner on site at [facility].

## 2024-06-27 NOTE — PATIENT INSTRUCTIONS
Today :Colby Cordero III had no medications administered during this visit.     For:   1. Crohn's disease of colon with rectal bleeding (Multi)         Your next appointment is due in:  COMPLETE        Please read the  Medication Guide that was given to you and reviewed during todays visit.     (Tell all doctors including dentists that you are taking this medication)     Go to the emergency room or call 911 if:  -You have signs of allergic reaction:   -Rash, hives, itching.   -Swollen, blistered, peeling skin.   -Swelling of face, lips, mouth, tongue or throat.   -Tightness of chest, trouble breathing, swallowing or talking     Call your doctor:  - If IV / injection site gets red, warm, swollen, itchy or leaks fluid or pus.     (Leave dressing on your IV site for at least 2 hours and keep area clean and dry  - If you get sick or have symptoms of infection or are not feeling well for any reason.    (Wash your hands often, stay away from people who are sick)  - If you have side effects from your medication that do not go away or are bothersome.     (Refer to the teaching your nurse gave you for side effects to call your doctor about)    - Common side effects may include:  stuffy nose, headache, feeling tired, muscle aches, upset stomach  - Before receiving any vaccines     - Call the [facility] at   If:  - You get sick, are on antibiotics, have had a recent vaccine, have surgery or dental work and your doctor wants your visit rescheduled.  - You need to cancel and reschedule your visit for any reason. Call at least 2 days before your visit if you need to cancel.   - Your insurance changes before your next visit.    (We will need to get approval from your new insurance. This can take up to two weeks.)     [facility] is opened Monday thru Friday. We are closed on weekends and holidays.   Voice mail will take your call if the center is closed. If you leave a message please allow  24 hours for a call back during weekdays. If you leave a message on a weekend/holiday, we will call you back the next business day.

## 2024-07-25 NOTE — PROGRESS NOTES
REASON FOR VISIT:  Crohn's disease     HPI:  Colby Cordero III is a 58 y.o. male who presents for follow up     Pt with history of Crohn's disease s/p resection   Pt currently on Skyrizi . Has completed IV induction doses and first on body injector administered on 07/26/2024 - feels much better in the past two weeks - less urgency after meals, abd pain, diarrhea  Moving bowels 2x per day , formed -  denies blood mucous or melena  Joint pain has worsened , knees, ankles, feet, shoulders, back - has seen rheumatology in the past - takes ibuprofen sparingly(5x a week)  the patient denies any skin rashes , mucous membrane ulcerations or eye pain / visual disturbances.  weight stable - slight gain  Appetite is normal - improved  Denies any upper GI symptoms        Med list notable for Skyrizi    Labs notable for    No fhx of CRC.       Prev endoscopic eval:   >> Colonoscopy 04/04/2024  Findings  End-to-end colocolonic anastomosis in the rectosigmoid. Not identified due to inadequate prep  External hemorrhoids observed during retroflexion  Multiple medium, scattered pancolonic diverticula of moderate severity  Abnormal mucosa, consistent with Crohn's disease. There was patchy inflammation in the sigmoid and descending colon. Normal appearing mucosa between inflammation; SES-CD scores: rectum 7, sigmoid/descending colon 3, transverse colon 0, ascending colon 3, total score 13  Performed multiple random forceps biopsies to rule out colitis     REVIEW OF SYSTEMS    Review of Systems   Constitutional: Negative for chills and fever.   Cardiovascular:  Negative for chest pain.   Respiratory:  Negative for cough and shortness of breath.    Musculoskeletal:  Positive for back pain, joint pain, myalgias and stiffness.   Gastrointestinal:  Negative for abdominal pain, constipation, diarrhea, dysphagia, heartburn, hematochezia, hemorrhoids, jaundice, melena, nausea and vomiting.          Allergies   Allergen Reactions     Cephalexin Hives    Atorvastatin Myalgia     Joint pain and fatigue       Past Medical History:   Diagnosis Date    Chronic otitis media 10/09/2023    CKD (chronic kidney disease)     stage 3    Crohn's disease, unspecified, without complications (Multi) 08/24/2020    Crohn's disease    Hemorrhage of anus and rectum     Hemorrhage of rectum and anus    Hemorrhage of rectum and anus 01/23/2024    Hyperlipidemia, unspecified     Dyslipidemia    Hypertension     Low back pain 02/19/2024    Otitis media, unspecified, right ear 01/04/2018    Acute otitis media, right    Pain of foot 02/19/2024    Pain of plantar aspect of heel 02/19/2024    Personal history of other diseases of the circulatory system     History of hypertension    Personal history of other diseases of the nervous system and sense organs     History of sleep apnea    Personal history of other diseases of the respiratory system 12/08/2018    History of upper respiratory infection    Personal history of other diseases of the respiratory system     History of bronchitis    Rash 02/19/2024    Comment on above: PERINEAL AREA;    Rash and other nonspecific skin eruption     Rash    Rupture of Achilles tendon 02/19/2024    Sleep apnea     Strain of lumbar region 02/19/2024    Unspecified abdominal pain 08/24/2020    Abdominal cramping    Upper respiratory tract infection 02/19/2024       Past Surgical History:   Procedure Laterality Date    COLONOSCOPY  08/24/2020    Complete Colonoscopy    HERNIA REPAIR  08/24/2020    Hernia Repair    OTHER SURGICAL HISTORY  08/24/2020    Carpal tunnel surgery    OTHER SURGICAL HISTORY  08/24/2020    Tonsillectomy    OTHER SURGICAL HISTORY  08/24/2020    Adenoidectomy    OTHER SURGICAL HISTORY  08/25/2022    Colon surgery    OTHER SURGICAL HISTORY  08/25/2022    Colonoscopy    OTHER SURGICAL HISTORY  08/25/2022    Hernia repair    OTHER SURGICAL HISTORY  08/25/2022    Small bowel resection    SMALL INTESTINE SURGERY  08/24/2020     Small Bowel Resection       Family History   Problem Relation Name Age of Onset    Stroke Mother      Hypertension Mother      Heart disease Father      Cancer Brother      Breast cancer Other Grandmother     Uterine cancer Other Grandmother        Social History     Tobacco Use    Smoking status: Every Day    Smokeless tobacco: Current    Tobacco comments:     Vape - everyday    Substance Use Topics    Alcohol use: Not Currently     Comment: occasional       Current Outpatient Medications   Medication Sig Dispense Refill    amLODIPine (Norvasc) 10 mg tablet Take 1 tablet (10 mg) by mouth once daily. 90 tablet 3    cyanocobalamin, vitamin B-12, (Vitamin B-12) 1,000 mcg tablet extended release Take 1 tablet (1,000 mcg) by mouth once daily.      DULoxetine (Cymbalta) 30 mg DR capsule Take 1 capsule (30 mg) by mouth once daily. Do not crush or chew. 90 capsule 3    ergocalciferol (Vitamin D-2) 1.25 MG (51733 UT) capsule Take 1 capsule (1,250 mcg) by mouth once daily.      hydrocortisone (Anusol-HC) 25 mg suppository Insert 1 suppository (25 mg) into the rectum once daily at bedtime. Insert 1 suppository into the rectum at bedtime for up to 6 nights (Patient not taking: Reported on 3/5/2024) 12 suppository 0    metoprolol succinate XL (Toprol-XL) 100 mg 24 hr tablet Take 1 tablet (100 mg) by mouth once daily. 90 tablet 3    risankizumab-rzaa (Skyrizi) 360 mg/2.4 mL (150 mg/mL) wearable injector injection Inject 2.4 mL (360 mg) under the skin every 8 (eight) weeks. Begin after induction doses at week 12 , and every 8 weeks thereafter 2.4 mL 3    risankizumab-rzaa (Skyrizi) 60 mg/mL solution injection Infuse 10 mL (600 mg total) into a venous catheter every 28 (twenty-eight) days.  at week 0, 4 and 8 10 mL 2    valsartan (Diovan) 160 mg tablet Take 1 tablet (160 mg) by mouth once daily. 90 tablet 1     No current facility-administered medications for this visit.       PHYSICAL EXAM:  There were no vitals taken for this  visit.     Physical Exam  Constitutional:       Comments: Awake   HENT:      Head: Normocephalic.   Cardiovascular:      Rate and Rhythm: Normal rate and regular rhythm.   Pulmonary:      Effort: Pulmonary effort is normal.      Breath sounds: Normal breath sounds.   Abdominal:      General: Bowel sounds are normal.      Palpations: Abdomen is soft.   Neurological:      Mental Status: He is alert.   Psychiatric:         Mood and Affect: Mood normal.          ASSESSMENT    58-year-old male diagnosed with Crohn's disease In 2011.  He is status post sigmoid resection.  He has previously failed Humira Remicade and Stelara.  He is now been on Skyrizi since May of this year and completed his first on box injection in July.  He reports the past 2 weeks he has felt the best than he ever has.  His most recent colonoscopy prior to the transition from Stelara to Skyrizi noted and SCS Crohn score of 17.  He continues to have joint pain.  He has seen rheumatology in the past but most of the medicines they put him on did not improve his symptoms.  He is currently taking NSAIDs either Aleve or ibuprofen 5 times a week.   -Referral to rheumatology for evaluation of his joint pain  -Repeat colonoscopy in December of this year to assess disease on the Stelara.  He was given a sample of Clenpiq prep  -I have advised him to start an omeprazole over-the-counter daily given that he is taking NSAIDs 5 days a week  -I encouraged him to quit smoking and vaping as well as diet and exercise to control his weight  -He will obtain blood work  I will see him in follow-up in 6 months    Evaluation requested by Dr. Denny Darby DO.   My final recommendations will be communicated back to the requesting physician by way of shared Medical record or letter to requesting physician via fax.      Attending Note:   I have personally performed a face to face assessment of this Patient, which included an interview, physical exam and Assessment and  Plan.  I have reviewed and confirmed the history and key findings as documented by the Medical Assistant and edited as appropriate.     Signature: Judy Muniz MD    Date: 7/25/2024  Time: 9:10 AM

## 2024-07-30 ENCOUNTER — APPOINTMENT (OUTPATIENT)
Dept: GASTROENTEROLOGY | Facility: CLINIC | Age: 59
End: 2024-07-30
Payer: COMMERCIAL

## 2024-07-30 VITALS
OXYGEN SATURATION: 95 % | HEIGHT: 72 IN | HEART RATE: 57 BPM | WEIGHT: 277 LBS | SYSTOLIC BLOOD PRESSURE: 143 MMHG | RESPIRATION RATE: 18 BRPM | DIASTOLIC BLOOD PRESSURE: 81 MMHG | BODY MASS INDEX: 37.52 KG/M2

## 2024-07-30 DIAGNOSIS — K50.119 CROHN'S DISEASE OF COLON WITH COMPLICATION (MULTI): Primary | ICD-10-CM

## 2024-07-30 PROCEDURE — 3077F SYST BP >= 140 MM HG: CPT | Performed by: INTERNAL MEDICINE

## 2024-07-30 PROCEDURE — 3079F DIAST BP 80-89 MM HG: CPT | Performed by: INTERNAL MEDICINE

## 2024-07-30 PROCEDURE — 99214 OFFICE O/P EST MOD 30 MIN: CPT | Performed by: INTERNAL MEDICINE

## 2024-07-30 PROCEDURE — 3008F BODY MASS INDEX DOCD: CPT | Performed by: INTERNAL MEDICINE

## 2024-07-30 RX ORDER — SODIUM PICOSULFATE, MAGNESIUM OXIDE, AND ANHYDROUS CITRIC ACID 10; 3.5; 12 MG/160ML; G/160ML; G/160ML
1 LIQUID ORAL 2 TIMES DAILY
Qty: 320 ML | Refills: 0 | Status: CANCELLED | OUTPATIENT
Start: 2024-07-30

## 2024-07-30 ASSESSMENT — ENCOUNTER SYMPTOMS
BACK PAIN: 1
DIARRHEA: 0
FEVER: 0
SHORTNESS OF BREATH: 0
HEARTBURN: 0
STIFFNESS: 1
CHILLS: 0
CONSTIPATION: 0
ABDOMINAL PAIN: 0
VOMITING: 0
MYALGIAS: 1
JAUNDICE: 0
NAUSEA: 0
HEMATOCHEZIA: 0
COUGH: 0

## 2024-08-05 ENCOUNTER — HOSPITAL ENCOUNTER (OUTPATIENT)
Dept: RADIOLOGY | Facility: CLINIC | Age: 59
Discharge: HOME | End: 2024-08-05
Payer: COMMERCIAL

## 2024-08-05 ENCOUNTER — APPOINTMENT (OUTPATIENT)
Dept: PODIATRY | Facility: CLINIC | Age: 59
End: 2024-08-05
Payer: COMMERCIAL

## 2024-08-05 DIAGNOSIS — M79.671 RIGHT FOOT PAIN: Primary | ICD-10-CM

## 2024-08-05 DIAGNOSIS — M72.2 PLANTAR FASCIITIS OF RIGHT FOOT: ICD-10-CM

## 2024-08-05 DIAGNOSIS — M79.671 RIGHT FOOT PAIN: ICD-10-CM

## 2024-08-05 PROCEDURE — 99213 OFFICE O/P EST LOW 20 MIN: CPT | Performed by: PODIATRIST

## 2024-08-05 PROCEDURE — 20550 NJX 1 TENDON SHEATH/LIGAMENT: CPT | Performed by: PODIATRIST

## 2024-08-05 PROCEDURE — 73620 X-RAY EXAM OF FOOT: CPT | Mod: RIGHT SIDE | Performed by: RADIOLOGY

## 2024-08-05 PROCEDURE — 73620 X-RAY EXAM OF FOOT: CPT | Mod: RT

## 2024-08-05 RX ORDER — TRIAMCINOLONE ACETONIDE 40 MG/ML
10 INJECTION, SUSPENSION INTRA-ARTICULAR; INTRAMUSCULAR
Status: COMPLETED | OUTPATIENT
Start: 2024-08-05 | End: 2024-08-05

## 2024-08-05 RX ORDER — LIDOCAINE HYDROCHLORIDE 10 MG/ML
2 INJECTION INFILTRATION; PERINEURAL
Status: COMPLETED | OUTPATIENT
Start: 2024-08-05 | End: 2024-08-05

## 2024-08-05 NOTE — PROGRESS NOTES
History Of Present Illness  Colby Cordero III is a 58 y.o. male presenting with chief complaint of: right foot pain, patient denies injury.  Patient had plantar fasciitis last year.  We did a plantar fascial release medially.  Patient is now having some intense pain on the plantar heel as well as lateral foot.  He does have some pitting edema of his foot.    KAYCE Denny Darby DO  Last visit 1/3/24     Past Medical History  He has a past medical history of Chronic otitis media (10/09/2023), CKD (chronic kidney disease), Crohn's disease, unspecified, without complications (Multi) (08/24/2020), Hemorrhage of anus and rectum, Hemorrhage of rectum and anus (01/23/2024), Hyperlipidemia, unspecified, Hypertension, Low back pain (02/19/2024), Otitis media, unspecified, right ear (01/04/2018), Pain of foot (02/19/2024), Pain of plantar aspect of heel (02/19/2024), Personal history of other diseases of the circulatory system, Personal history of other diseases of the nervous system and sense organs, Personal history of other diseases of the respiratory system (12/08/2018), Personal history of other diseases of the respiratory system, Rash (02/19/2024), Rash and other nonspecific skin eruption, Rupture of Achilles tendon (02/19/2024), Sleep apnea, Strain of lumbar region (02/19/2024), Unspecified abdominal pain (08/24/2020), and Upper respiratory tract infection (02/19/2024).    Surgical History  He has a past surgical history that includes Other surgical history (08/24/2020); Other surgical history (08/24/2020); Other surgical history (08/24/2020); Other surgical history (08/25/2022); Other surgical history (08/25/2022); Other surgical history (08/25/2022); Other surgical history (08/25/2022); Hernia repair (08/24/2020); Colonoscopy (08/24/2020); and Small intestine surgery (08/24/2020).     Social History  He reports that he has been smoking. He uses smokeless tobacco. He reports that he does not currently use alcohol.  He reports that he does not use drugs.    Family History  Family History   Problem Relation Name Age of Onset    Stroke Mother      Hypertension Mother      Heart disease Father      Cancer Brother      Breast cancer Other Grandmother     Uterine cancer Other Grandmother         Allergies  Cephalexin and Atorvastatin    Medications  Current Outpatient Medications   Medication Sig Dispense Refill    amLODIPine (Norvasc) 10 mg tablet Take 1 tablet (10 mg) by mouth once daily. 90 tablet 3    cyanocobalamin, vitamin B-12, (Vitamin B-12) 1,000 mcg tablet extended release Take 1 tablet (1,000 mcg) by mouth once daily.      DULoxetine (Cymbalta) 30 mg DR capsule Take 1 capsule (30 mg) by mouth once daily. Do not crush or chew. 90 capsule 3    ergocalciferol (Vitamin D-2) 1.25 MG (13176 UT) capsule Take 1 capsule (1,250 mcg) by mouth once daily.      hydrocortisone (Anusol-HC) 25 mg suppository Insert 1 suppository (25 mg) into the rectum once daily at bedtime. Insert 1 suppository into the rectum at bedtime for up to 6 nights 12 suppository 0    metoprolol succinate XL (Toprol-XL) 100 mg 24 hr tablet Take 1 tablet (100 mg) by mouth once daily. 90 tablet 3    risankizumab-rzaa (Skyrizi) 360 mg/2.4 mL (150 mg/mL) wearable injector injection Inject 2.4 mL (360 mg) under the skin every 8 (eight) weeks. Begin after induction doses at week 12 , and every 8 weeks thereafter 2.4 mL 3    risankizumab-rzaa (Skyrizi) 60 mg/mL solution injection Infuse 10 mL (600 mg total) into a venous catheter every 28 (twenty-eight) days.  at week 0, 4 and 8 10 mL 2    valsartan (Diovan) 160 mg tablet Take 1 tablet (160 mg) by mouth once daily. 90 tablet 1     No current facility-administered medications for this visit.       Review of Systems    REVIEW OF SYSTEMS  GENERAL:  Negative for malaise, significant weight loss, fever  CARDIOVASCULAR: leg swelling   MUSCULOSKELETAL:  Negative for joint pain or swelling, back pain, and muscle pain.  SKIN:   Negative for lesions, rash, and itching  PSYCH:  Negative for sleep disturbance, mood disorder and recent psychosocial stressors  NEURO: Negative, denies any burning, tingling or numbness     Objective:   Vasc: DP and PT pulses are palpable bilateral.  CFT is less than 3 seconds bilateral.  Skin temperature is warm to cool proximal to distal bilateral.  Pitting edema of the right foot dorsally    Neuro:  Light touch is intact to the foot bilateral.   There is no clonus noted.  The hallux is downgoing bilateral.      Derm: Nails are normal. Skin is supple with normal texture and turgor noted.  Webspaces are clean, dry and intact bilateral.  There are no hyperkeratoses, ulcerations, verruca or other lesions noted.      Ortho: Muscle strength is 5/5 for all pedal groups tested.  Ankle joint, subtalar joint, 1st MPJ and lesser MPJ ROM is full and without pain or crepitus.  The foot type is rectus bilateral off weight bearing.  Discomfort with palpation of the insertion site of the lateral band of the plantar fascia.    X-rays are personally reviewed and no sign of stress fracture is appreciated fifth metatarsal or pitting edema is.      Assessment/Plan     Diagnoses and all orders for this visit:  Right foot pain  -     XR foot right 1-2 views; Future  Plantar fasciitis of right foot      Patient has lateral band plantar fasciitis.  Will try an injection today.  If this does not alleviate patient's symptoms, patient is to give me a call in 2 weeks.           Patient ID: Colby Cordero III is a 58 y.o. male.    S Inj/Asp on 8/5/2024 3:06 PM  Indications: pain  Details: 25 G needle, plantar approach  Medications: 10 mg triamcinolone acetonide 40 mg/mL; 2 mL lidocaine 10 mg/mL (1 %)    Right lateral plantar insertion site of  Immediately prior to procedure a time out was called to verify the correct patient, procedure, equipment, support staff and site/side marked as required. Patient was prepped and draped in the usual  sterile fashion.

## 2024-08-06 NOTE — PROGRESS NOTES
Subjective   Patient ID: [id number]   Colby Cordero III is a 58 y.o. male with Crohn's with IA, HTN, HLD, and BMI 37, presents for follow up on IA     Current IS:  - Cindy, 4/24, did the infusion and now on the shots, every 2 months      Prior IS:  - Stelara started in 9/2017 to 4/24, stopped working   - Humira 2013- held in 2014 as was noted to have + quantiferon gold  - Entivyo (Vedolizuman) June 2015 due to recurring GI sx   - SSZ added in 5/2016, no significant improvement, dc  - Remicade 9/16 -> 9/2017, worsening joint pains  - Leflunomide [id number]  - MTX [id number]  - Budesonide  - Prednisone  - Mesalamine      HPI  Shoulders, hands, feet, knees, hips, wrists hurt   Same as Stelara, joint pains are the same   He reports most medications did not help his joints  Only Prednisone works   He had his last course for the Crohn's a few months ago and felt great on it  All joint pains came back when he stopped the prednisone   Activity is worse for the pain  As the day progresses the pain gets worse  MS of half an hour   Swelling by the end of the day of his feet and ankles   Plantar fasciitis, did release surgery with podiatry  Now has lateral band plantar fasciitis -> injection   Colonoscopy 4/24:  External hemorrhoids  Multiple medium, scattered pancolonic diverticula of moderate severity  Abnormal mucosa, consistent with Crohn's disease. There was patchy inflammation in the sigmoid and descending colon. Normal appearing mucosa between inflammation  Biopsies showed chronic active inflammation   Compliant with meds  No side effects reported  No dactylitis  No IBP   No buttocks pain  No episodes of eye inflammation  No sicca sx  No chest pain, cough or dyspnea  No rashes  No infections  No psoriasis     ROS:  As per HPI     Rheum hx (Recall from Dr. Freeman's notes):  Initially saw Dr. Khanna for many years at . Then switched care to and saw Dr. Bell at [facility]. He is now transferring care back to , presents to  establish care as well as address PCP concerns as noted on referral.      Crohns: diagnosed (mass in colon, removed), recurrent- segmental crohns in L colon ( at least since [id number]- initially presented with bowel changes and found to have mass in 2011 (circumferential mass like appears- underwent resection and repeat colonoscopy in Jan 2012 unremarkable. Then, Jan 2013- recurrent GI symptoms, repeat colonoscopy, friable edematous inflammatory changes and dx with segmental Crohns disease.      Pt states for many years now he had been experiencing pain/stiffness in legs, feet, hips, shoulder and arms, and fatigue. About 3-4 weeks ago- he saw his PCP and mentioned above complaints including muscle pain, stiffness, and joint pain. He was recommended to stop Atorvastatin and see if related to it (had been on Atorvastatin for many years). Since stopping he feels about 85-90% better in terms of above sx.   He currently is having issues with plantar fascitis R foot, previous hx on L and needed a procedure  Denies any other current joint pain or swelling  No dactylitis  No significant swelling  No active stiffness currently  Mild soreness in hands, tennis elbow once in while  Intermittent knee pain      Denies fever, unintentional weight loss  Denies hx of uveitis, gets annual eye exam, no hx of psoriasis. No hx of blood clot.     ID HX: Saw Dr. Townsend in 2015 for Latent TB: TB test negative in July 2013, while on Humira: routine + PPD on 12/2014, + quant TB, CXR nml.. Treated with Isoniazid and pyridoxine for latent TB. Hep serology previously negative 2013, 2019 Hep C Ab NR.      FMHX of autoimmune disease: None   PSH: CTS on R, plantar fascitis on L, colon surgery  SocHX: Current vape, rare ETOH use    Patient Active Problem List   Diagnosis    Abdominal wall abscess    Abdominal bloating    Chronic mastoiditis of both sides    Bilateral chronic otitis media    Chronic suppurative otitis media of right ear     Conductive hearing loss, bilateral    Hearing loss of right ear due to cerumen impaction    Mixed conductive and sensorineural hearing loss of both ears    Mixed conductive and sensorineural hearing loss, unilateral, left ear with restricted hearing on the contralateral side    Sensorineural hearing loss (SNHL) of right ear with restricted hearing of left ear    Crohn's disease of colon (Multi)    Cuboid syndrome    Current every day smoker    Gout    Enteropathic arthritis associated with Crohn's disease (Multi)    Fatigue    Hyperglycemia    Hyperlipidemia    Hypertension    Inguinal hernia    Irritable bowel syndrome    Latent tuberculosis infection    Mass of shoulder region    Mechanical pain of right foot    Nevus, atypical    Osteoarthritis of foot joint    Perforation of both tympanic membranes due to otitis media    Myalgia    Obesity    Nontraumatic perforation of tympanic membrane    Peripheral neuropathy    Peroneal tendinitis of left lower extremity    Plantar fascial fibromatosis of left foot    Plantar fasciitis, left    Positive BRANNON (antinuclear antibody)    Sensation of fullness in both ears    Sensorineural hearing loss (SNHL) of left ear    Skin tag, acquired    VALENTINO (obstructive sleep apnea)    Snoring    Stage 3a chronic kidney disease (Multi)    Tinnitus, subjective    Tympanic membrane perforation, left    Vitamin D deficiency    Vitamin B12 deficiency    Plantar fasciitis of right foot    Lateral epicondylitis    PPD positive, treated    Carpal tunnel syndrome on right    Arthralgia    Cigarette nicotine dependence without complication    Prostate cancer screening    Dyslipidemia    Enteropathic arthritis    Hemorrhoids    History of hypertension     Past Medical History:   Diagnosis Date    Chronic otitis media 10/09/2023    CKD (chronic kidney disease)     stage 3    Crohn's disease, unspecified, without complications (Multi) 08/24/2020    Crohn's disease    Hemorrhage of anus and rectum      Hemorrhage of rectum and anus    Hemorrhage of rectum and anus 01/23/2024    Hyperlipidemia, unspecified     Dyslipidemia    Hypertension     Low back pain 02/19/2024    Otitis media, unspecified, right ear 01/04/2018    Acute otitis media, right    Pain of foot 02/19/2024    Pain of plantar aspect of heel 02/19/2024    Personal history of other diseases of the circulatory system     History of hypertension    Personal history of other diseases of the nervous system and sense organs     History of sleep apnea    Personal history of other diseases of the respiratory system 12/08/2018    History of upper respiratory infection    Personal history of other diseases of the respiratory system     History of bronchitis    Rash 02/19/2024    Comment on above: PERINEAL AREA;    Rash and other nonspecific skin eruption     Rash    Rupture of Achilles tendon 02/19/2024    Sleep apnea     Strain of lumbar region 02/19/2024    Unspecified abdominal pain 08/24/2020    Abdominal cramping    Upper respiratory tract infection 02/19/2024     Past Surgical History:   Procedure Laterality Date    COLONOSCOPY  08/24/2020    Complete Colonoscopy    HERNIA REPAIR  08/24/2020    Hernia Repair    OTHER SURGICAL HISTORY  08/24/2020    Carpal tunnel surgery    OTHER SURGICAL HISTORY  08/24/2020    Tonsillectomy    OTHER SURGICAL HISTORY  08/24/2020    Adenoidectomy    OTHER SURGICAL HISTORY  08/25/2022    Colon surgery    OTHER SURGICAL HISTORY  08/25/2022    Colonoscopy    OTHER SURGICAL HISTORY  08/25/2022    Hernia repair    OTHER SURGICAL HISTORY  08/25/2022    Small bowel resection    SMALL INTESTINE SURGERY  08/24/2020    Small Bowel Resection     Social History     Socioeconomic History    Marital status:      Spouse name: Not on file    Number of children: Not on file    Years of education: Not on file    Highest education level: Not on file   Occupational History    Not on file   Tobacco Use    Smoking status: Every Day     Smokeless tobacco: Current    Tobacco comments:     Vape - everyday    Vaping Use    Vaping status: Every Day   Substance and Sexual Activity    Alcohol use: Not Currently     Comment: occasional    Drug use: Never    Sexual activity: Yes   Other Topics Concern    Not on file   Social History Narrative    Not on file     Social Determinants of Health     Financial Resource Strain: Not on file   Food Insecurity: Not on file   Transportation Needs: Not on file   Physical Activity: Not on file   Stress: Not on file   Social Connections: Not on file   Intimate Partner Violence: Not on file   Housing Stability: Not on file     Allergies   Allergen Reactions    Cephalexin Hives    Atorvastatin Myalgia     Joint pain and fatigue     Current Outpatient Medications:     amLODIPine (Norvasc) 10 mg tablet, Take 1 tablet (10 mg) by mouth once daily., Disp: 90 tablet, Rfl: 3    cyanocobalamin, vitamin B-12, (Vitamin B-12) 1,000 mcg tablet extended release, Take 1 tablet (1,000 mcg) by mouth once daily., Disp: , Rfl:     ergocalciferol (Vitamin D-2) 1.25 MG (64054 UT) capsule, Take 1 capsule (1,250 mcg) by mouth once daily., Disp: , Rfl:     metoprolol succinate XL (Toprol-XL) 100 mg 24 hr tablet, Take 1 tablet (100 mg) by mouth once daily., Disp: 90 tablet, Rfl: 3    risankizumab-rzaa (Skyrizi) 360 mg/2.4 mL (150 mg/mL) wearable injector injection, Inject 2.4 mL (360 mg) under the skin every 8 (eight) weeks. Begin after induction doses at week 12 , and every 8 weeks thereafter, Disp: 2.4 mL, Rfl: 3    valsartan (Diovan) 160 mg tablet, Take 1 tablet (160 mg) by mouth once daily., Disp: 90 tablet, Rfl: 1     Objective   Visit Vitals  /79   Pulse 90   Temp 37.1 °C (98.7 °F)   Resp 20     Physical Exam:  General: AAOx3, Cooperative  Skin: No rashes, ulcers or photosensitive areas  MSK: Upper Extremities:  Hand/Fingers: No erythema, edema, tenderness or warmth at DIP, PIP, or MCP joints, FROM grossly. Good hand . No  nodules. No deformities   Wrists: No erythema, edema, warmth or tenderness at wrist, FROM grossly  Elbows: No tenderness, edema, erythema or warmth at elbows, FROM grossly. No nodules   Shoulders: No edema, erythema, tenderness or warmth at shoulders. FROM  Lower Extremities:   Hips: No obvious deformities. No joint tenderness, normal ROM grossly. No trochanteric bursae TTP  Knees: No tenderness, deformities, edema, rashes, or warmth, normal ROM grossly. No crepitus, no pes anserine bursa TTP   Ankles, feet: No deformities, tenderness, edema, erythema, ulceration, or warmth at the ankle or MTP/IP joints, normal ROM grossly  Spine: No spinal tenderness to palpation. No SI joint tenderness     Lab Results   Component Value Date    WBC 7.8 01/23/2024    HGB 14.1 01/23/2024    HCT 41.4 01/23/2024    MCV 87 01/23/2024     01/23/2024        Chemistry    Lab Results   Component Value Date/Time     01/23/2024 1447    K 4.4 01/23/2024 1447     01/23/2024 1447    CO2 26 01/23/2024 1447    BUN 18 01/23/2024 1447    CREATININE 1.61 (H) 01/23/2024 1447    Lab Results   Component Value Date/Time    CALCIUM 9.4 01/23/2024 1447    ALKPHOS 64 01/23/2024 1447    AST 22 01/23/2024 1447    ALT 22 01/23/2024 1447    BILITOT 0.5 01/23/2024 1447        Lab Results   Component Value Date    CRP 1.20 (H) 01/23/2024      Lab Results   Component Value Date    SEDRATE 11 01/23/2024      Lab Results   Component Value Date    HEPCAB NON-REACTIVE 04/17/2019      Lab Results   Component Value Date    ALT 22 01/23/2024    AST 22 01/23/2024    ALKPHOS 64 01/23/2024    BILITOT 0.5 01/23/2024     XR foot right 1-2 views  Narrative: Interpreted By:  Tulio Dueñas,   STUDY:  XR FOOT RIGHT 1-2 VIEWS      INDICATION:  Signs/Symptoms:pitting edema lateral 5th met.      COMPARISON:  June 9, 2023      ACCESSION NUMBER(S):  [id number]      ORDERING CLINICIAN:  ABUNDIO DIEGO      FINDINGS:  Fairly advanced 1st metatarsophalangeal  osteoarthritis unchanged.      Large focus of Achilles calcification and midfoot osteoarthritis also  unchanged.      No acute findings.      Impression: Fairly advanced 1st metatarsophalangeal osteoarthritis unchanged.      Large focus of Achilles calcification and midfoot osteoarthritis also  unchanged.      Signed by: Tulio Dueñas 8/7/2024 7:33 PM  Dictation workstation:   [id number]     === 06/09/23 ===  FOOT  1 cm focus of calcification in the distal Achilles. This suggests chronic tendinosis or remote injury.  Advanced right 1st metatarsophalangeal osteoarthritis    CT chest:  1.  Few small bilateral noncalcified pulmonary nodules measuring up to 3 mm, likely benign. Continued screening with low-dose noncontrast chest CT in 12 months (from current date) is recommended.  2. Mild apical emphysema.  3. Mild coronary artery calcifications.    Assessment/Plan    This is a This is a 58 Y M with Crohn's colitis and IA, statin induced myalgia, presenting for follow up  Last seen  by Dr. Freeman in 8/23     Pt with long standing hx of Crohns (currently on Stelara with good control of GI sx), also possible component of IBD related arthritis (joint sx stable), was noted to have recurring myalgias, joint pain without swelling, fatigue, which improved since stopping Atorvastatin (likely statin induced myalgia, normal CK, doubt HMGCR myopathy as sx improved since stopping), no cutaneous manifestations to suggest DM and intact mm strength (doubt PM). Repeat CK nml, aldolase nml, AST/ALT nml. Low titer positive BRANNON and dsDNA ,the most likely explanation is prior TNFi use (Humira and Remicade)- which can cause + BRANNON/dsDNA. No current sx to suggest DILE/SLE. No photosensitive rash, Raynauds, hx of effusions. Nml C3/C4, histone antibody negative, no cytopenia, nml CK. Workup was recently completed by PCP 6/1/23. ESR/CRP nml. CMP shows mild renal insufficiency (cr 1.4 7,GFR > 55, since 4/2019, of note, no proteinuria or RBC). His  current sx are more mechanical, no inflammatory component. He did respond to steroids, but all the previous csDMARDs as well as the biologics (all of them) did not help his joint pains at all. Discussion done with the patient about his condition      Labs:  4/24: Colonic biopsy -> chronic active colitis   1/24: Cr 1.61, CRP 1.2, calprotectin 730, rest of CMP, CBC, PSA, ESR wnl  11/23: Cr 1.5  7/28/23 dsDNA 18  6/2023 BRANNON 1:160, dsDNA 24, rest ANGELICA negative. C3/C4 nml. Nml CBC, CK, ESR/CRP, histone Ab, UA + glucose. SPEP no monoclonal protein  11/2018 BRANNON 1:160, dsDNA 13    Imaging:  Xray right foot:    Xray hands, feet and knees 9/22: OA      # Crohns colitis, on Skyrizi per GI  # IBD related arthritis is questionable, likely OA ongoing since initial dx, currently no synovitis, no improvement with DMARDs except predisone  # Myalgia, resolved, likely related to statin use, resolved since stopping. Recommend treatment for HLD with non- statin agents. Nml CK/aldolase, doubt HMGCR myopathy  # Low titer BRANNON, dsDNA, hx of TNFi usse (Humira [id number], Remicade [id number], first + BRANNON 2018). No current sx/findings to suggest DILE/SLE  # CKD stage III, stable, no proteinuria/RBC  # Vitamin D def  # Diffuse OA      - Continue Skyrizi per GI, too early   - Labs with Dr. Kim, will inform me once he has them done  - Can't add any csDMARDs as he reports no improvement with them, will try low dose of pred, can help with OA pain as well since nothing is working   - Prednisone 10 mg for a week then 5 mg daily and pt will let me know if there is improvement   - Unable to use chronic NSAIDs as CKD  - Tylenol upto 2gm as needed  - Discussed sx of photosensitive rash, Raynaud's, worsening renal function/cytopenia, will monitor for SLE manifestations but low clinical/serological concerns      RTC in 3 months    Plan, including risks and benefits, was discussed with the patient, informed on how to reach us.     To schedule an appointment,  call [phone]  To reach the rheumatology office, call [phone]    Tiffani Mojica MD      Division of Rheumatology  [facility]

## 2024-08-13 ENCOUNTER — APPOINTMENT (OUTPATIENT)
Dept: RHEUMATOLOGY | Facility: CLINIC | Age: 59
End: 2024-08-13
Payer: COMMERCIAL

## 2024-08-13 VITALS
WEIGHT: 279 LBS | TEMPERATURE: 98.7 F | SYSTOLIC BLOOD PRESSURE: 143 MMHG | DIASTOLIC BLOOD PRESSURE: 79 MMHG | RESPIRATION RATE: 20 BRPM | BODY MASS INDEX: 37.84 KG/M2 | HEART RATE: 90 BPM

## 2024-08-13 DIAGNOSIS — K50.10 CROHN'S DISEASE OF COLON WITHOUT COMPLICATION (MULTI): Primary | ICD-10-CM

## 2024-08-13 DIAGNOSIS — M19.072 PRIMARY OSTEOARTHRITIS OF BOTH FEET: ICD-10-CM

## 2024-08-13 DIAGNOSIS — K63.9 INFLAMMATORY BOWEL ARTHRITIS: ICD-10-CM

## 2024-08-13 DIAGNOSIS — Z79.899 ON USTEKINUMAB THERAPY: ICD-10-CM

## 2024-08-13 DIAGNOSIS — M19.071 PRIMARY OSTEOARTHRITIS OF BOTH FEET: ICD-10-CM

## 2024-08-13 DIAGNOSIS — E55.9 VITAMIN D DEFICIENCY: ICD-10-CM

## 2024-08-13 DIAGNOSIS — M19.042 PRIMARY OSTEOARTHRITIS OF BOTH HANDS: ICD-10-CM

## 2024-08-13 DIAGNOSIS — M07.60 INFLAMMATORY BOWEL ARTHRITIS: ICD-10-CM

## 2024-08-13 DIAGNOSIS — M19.041 PRIMARY OSTEOARTHRITIS OF BOTH HANDS: ICD-10-CM

## 2024-08-13 DIAGNOSIS — N18.31 STAGE 3A CHRONIC KIDNEY DISEASE (MULTI): ICD-10-CM

## 2024-08-13 DIAGNOSIS — M17.0 PRIMARY OSTEOARTHRITIS OF BOTH KNEES: ICD-10-CM

## 2024-08-13 PROCEDURE — 99215 OFFICE O/P EST HI 40 MIN: CPT | Performed by: STUDENT IN AN ORGANIZED HEALTH CARE EDUCATION/TRAINING PROGRAM

## 2024-08-13 PROCEDURE — 3077F SYST BP >= 140 MM HG: CPT | Performed by: STUDENT IN AN ORGANIZED HEALTH CARE EDUCATION/TRAINING PROGRAM

## 2024-08-13 PROCEDURE — 3078F DIAST BP <80 MM HG: CPT | Performed by: STUDENT IN AN ORGANIZED HEALTH CARE EDUCATION/TRAINING PROGRAM

## 2024-08-14 ENCOUNTER — LAB (OUTPATIENT)
Dept: LAB | Facility: LAB | Age: 59
End: 2024-08-14
Payer: COMMERCIAL

## 2024-08-14 DIAGNOSIS — K50.119 CROHN'S DISEASE OF COLON WITH COMPLICATION (MULTI): ICD-10-CM

## 2024-08-14 LAB
ALBUMIN SERPL BCP-MCNC: 4.3 G/DL (ref 3.4–5)
ALP SERPL-CCNC: 87 U/L (ref 33–120)
ALT SERPL W P-5'-P-CCNC: 14 U/L (ref 10–52)
ANION GAP SERPL CALC-SCNC: 12 MMOL/L (ref 10–20)
AST SERPL W P-5'-P-CCNC: 12 U/L (ref 9–39)
BASOPHILS # BLD AUTO: 0.08 X10*3/UL (ref 0–0.1)
BASOPHILS NFR BLD AUTO: 1.1 %
BILIRUB SERPL-MCNC: 0.5 MG/DL (ref 0–1.2)
BUN SERPL-MCNC: 14 MG/DL (ref 6–23)
CALCIUM SERPL-MCNC: 9.3 MG/DL (ref 8.6–10.3)
CHLORIDE SERPL-SCNC: 106 MMOL/L (ref 98–107)
CO2 SERPL-SCNC: 25 MMOL/L (ref 21–32)
CREAT SERPL-MCNC: 1.56 MG/DL (ref 0.5–1.3)
CRP SERPL-MCNC: 0.4 MG/DL
EGFRCR SERPLBLD CKD-EPI 2021: 51 ML/MIN/1.73M*2
EOSINOPHIL # BLD AUTO: 0.34 X10*3/UL (ref 0–0.7)
EOSINOPHIL NFR BLD AUTO: 4.6 %
ERYTHROCYTE [DISTWIDTH] IN BLOOD BY AUTOMATED COUNT: 12.8 % (ref 11.5–14.5)
ERYTHROCYTE [SEDIMENTATION RATE] IN BLOOD BY WESTERGREN METHOD: 7 MM/H (ref 0–20)
GLUCOSE SERPL-MCNC: 92 MG/DL (ref 74–99)
HCT VFR BLD AUTO: 42.9 % (ref 41–52)
HGB BLD-MCNC: 14.5 G/DL (ref 13.5–17.5)
IMM GRANULOCYTES # BLD AUTO: 0.02 X10*3/UL (ref 0–0.7)
IMM GRANULOCYTES NFR BLD AUTO: 0.3 % (ref 0–0.9)
LYMPHOCYTES # BLD AUTO: 1.81 X10*3/UL (ref 1.2–4.8)
LYMPHOCYTES NFR BLD AUTO: 24.3 %
MCH RBC QN AUTO: 29.4 PG (ref 26–34)
MCHC RBC AUTO-ENTMCNC: 33.8 G/DL (ref 32–36)
MCV RBC AUTO: 87 FL (ref 80–100)
MONOCYTES # BLD AUTO: 0.72 X10*3/UL (ref 0.1–1)
MONOCYTES NFR BLD AUTO: 9.7 %
NEUTROPHILS # BLD AUTO: 4.47 X10*3/UL (ref 1.2–7.7)
NEUTROPHILS NFR BLD AUTO: 60 %
NRBC BLD-RTO: 0 /100 WBCS (ref 0–0)
PLATELET # BLD AUTO: 207 X10*3/UL (ref 150–450)
POTASSIUM SERPL-SCNC: 4.4 MMOL/L (ref 3.5–5.3)
PROT SERPL-MCNC: 6.5 G/DL (ref 6.4–8.2)
RBC # BLD AUTO: 4.94 X10*6/UL (ref 4.5–5.9)
SODIUM SERPL-SCNC: 139 MMOL/L (ref 136–145)
WBC # BLD AUTO: 7.4 X10*3/UL (ref 4.4–11.3)

## 2024-08-14 PROCEDURE — 85025 COMPLETE CBC W/AUTO DIFF WBC: CPT

## 2024-08-14 PROCEDURE — 36415 COLL VENOUS BLD VENIPUNCTURE: CPT

## 2024-08-14 PROCEDURE — 86140 C-REACTIVE PROTEIN: CPT

## 2024-08-14 PROCEDURE — 85652 RBC SED RATE AUTOMATED: CPT

## 2024-08-14 PROCEDURE — 80053 COMPREHEN METABOLIC PANEL: CPT

## 2024-08-19 DIAGNOSIS — M72.2 PLANTAR FASCIITIS OF RIGHT FOOT: ICD-10-CM

## 2024-08-19 DIAGNOSIS — M79.671 RIGHT FOOT PAIN: Primary | ICD-10-CM

## 2024-08-20 ENCOUNTER — LAB (OUTPATIENT)
Dept: LAB | Facility: LAB | Age: 59
End: 2024-08-20
Payer: COMMERCIAL

## 2024-08-20 DIAGNOSIS — K50.119 CROHN'S DISEASE OF COLON WITH COMPLICATION (MULTI): ICD-10-CM

## 2024-08-20 PROCEDURE — 83993 ASSAY FOR CALPROTECTIN FECAL: CPT

## 2024-08-23 LAB — CALPROTECTIN STL-MCNT: 207 UG/G

## 2024-09-04 ENCOUNTER — TELEPHONE (OUTPATIENT)
Dept: GASTROENTEROLOGY | Facility: CLINIC | Age: 59
End: 2024-09-04
Payer: COMMERCIAL

## 2024-09-04 NOTE — TELEPHONE ENCOUNTER
----- Message from Judy Muniz sent at 9/3/2024  8:00 PM EDT -----  The fecal calprotectin is much better than it has ever been. Let us know if you saw any improvement with the prednisone. Keep your appt

## 2024-09-05 ENCOUNTER — APPOINTMENT (OUTPATIENT)
Dept: RADIOLOGY | Facility: HOSPITAL | Age: 59
End: 2024-09-05
Payer: COMMERCIAL

## 2024-09-10 ENCOUNTER — APPOINTMENT (OUTPATIENT)
Dept: AUDIOLOGY | Facility: CLINIC | Age: 59
End: 2024-09-10
Payer: COMMERCIAL

## 2024-09-10 ENCOUNTER — APPOINTMENT (OUTPATIENT)
Dept: OTOLARYNGOLOGY | Facility: CLINIC | Age: 59
End: 2024-09-10
Payer: COMMERCIAL

## 2024-11-06 ENCOUNTER — LAB (OUTPATIENT)
Dept: LAB | Facility: LAB | Age: 59
End: 2024-11-06
Payer: COMMERCIAL

## 2024-11-06 DIAGNOSIS — M19.072 PRIMARY OSTEOARTHRITIS OF BOTH FEET: ICD-10-CM

## 2024-11-06 DIAGNOSIS — M19.041 PRIMARY OSTEOARTHRITIS OF BOTH HANDS: ICD-10-CM

## 2024-11-06 DIAGNOSIS — K50.10 CROHN'S DISEASE OF COLON WITHOUT COMPLICATION (MULTI): ICD-10-CM

## 2024-11-06 DIAGNOSIS — M19.071 PRIMARY OSTEOARTHRITIS OF BOTH FEET: ICD-10-CM

## 2024-11-06 DIAGNOSIS — M07.60 INFLAMMATORY BOWEL ARTHRITIS: ICD-10-CM

## 2024-11-06 DIAGNOSIS — E55.9 VITAMIN D DEFICIENCY: ICD-10-CM

## 2024-11-06 DIAGNOSIS — K63.9 INFLAMMATORY BOWEL ARTHRITIS: ICD-10-CM

## 2024-11-06 DIAGNOSIS — M19.042 PRIMARY OSTEOARTHRITIS OF BOTH HANDS: ICD-10-CM

## 2024-11-06 DIAGNOSIS — N18.31 STAGE 3A CHRONIC KIDNEY DISEASE (MULTI): ICD-10-CM

## 2024-11-06 DIAGNOSIS — M17.0 PRIMARY OSTEOARTHRITIS OF BOTH KNEES: ICD-10-CM

## 2024-11-06 LAB
ALBUMIN SERPL BCP-MCNC: 4.3 G/DL (ref 3.4–5)
ALP SERPL-CCNC: 76 U/L (ref 33–136)
ALT SERPL W P-5'-P-CCNC: 23 U/L (ref 10–52)
ANION GAP SERPL CALC-SCNC: 11 MMOL/L (ref 10–20)
AST SERPL W P-5'-P-CCNC: 15 U/L (ref 9–39)
BASOPHILS # BLD AUTO: 0.07 X10*3/UL (ref 0–0.1)
BASOPHILS NFR BLD AUTO: 0.8 %
BILIRUB SERPL-MCNC: 0.6 MG/DL (ref 0–1.2)
BUN SERPL-MCNC: 18 MG/DL (ref 6–23)
CALCIUM SERPL-MCNC: 9.3 MG/DL (ref 8.6–10.3)
CHLORIDE SERPL-SCNC: 104 MMOL/L (ref 98–107)
CO2 SERPL-SCNC: 29 MMOL/L (ref 21–32)
CREAT SERPL-MCNC: 1.5 MG/DL (ref 0.5–1.3)
CRP SERPL-MCNC: 0.49 MG/DL
EGFRCR SERPLBLD CKD-EPI 2021: 53 ML/MIN/1.73M*2
EOSINOPHIL # BLD AUTO: 0.37 X10*3/UL (ref 0–0.7)
EOSINOPHIL NFR BLD AUTO: 4.5 %
ERYTHROCYTE [DISTWIDTH] IN BLOOD BY AUTOMATED COUNT: 14.3 % (ref 11.5–14.5)
ERYTHROCYTE [SEDIMENTATION RATE] IN BLOOD BY WESTERGREN METHOD: 14 MM/H (ref 0–20)
GLUCOSE SERPL-MCNC: 137 MG/DL (ref 74–99)
HCT VFR BLD AUTO: 43.6 % (ref 41–52)
HGB BLD-MCNC: 14.5 G/DL (ref 13.5–17.5)
IMM GRANULOCYTES # BLD AUTO: 0.03 X10*3/UL (ref 0–0.7)
IMM GRANULOCYTES NFR BLD AUTO: 0.4 % (ref 0–0.9)
LYMPHOCYTES # BLD AUTO: 2.83 X10*3/UL (ref 1.2–4.8)
LYMPHOCYTES NFR BLD AUTO: 34.1 %
MCH RBC QN AUTO: 29.2 PG (ref 26–34)
MCHC RBC AUTO-ENTMCNC: 33.3 G/DL (ref 32–36)
MCV RBC AUTO: 88 FL (ref 80–100)
MONOCYTES # BLD AUTO: 0.48 X10*3/UL (ref 0.1–1)
MONOCYTES NFR BLD AUTO: 5.8 %
NEUTROPHILS # BLD AUTO: 4.52 X10*3/UL (ref 1.2–7.7)
NEUTROPHILS NFR BLD AUTO: 54.4 %
NRBC BLD-RTO: 0 /100 WBCS (ref 0–0)
PLATELET # BLD AUTO: 203 X10*3/UL (ref 150–450)
POTASSIUM SERPL-SCNC: 4 MMOL/L (ref 3.5–5.3)
PROT SERPL-MCNC: 6.7 G/DL (ref 6.4–8.2)
RBC # BLD AUTO: 4.97 X10*6/UL (ref 4.5–5.9)
SODIUM SERPL-SCNC: 140 MMOL/L (ref 136–145)
WBC # BLD AUTO: 8.3 X10*3/UL (ref 4.4–11.3)

## 2024-11-06 PROCEDURE — 86140 C-REACTIVE PROTEIN: CPT

## 2024-11-06 PROCEDURE — 36415 COLL VENOUS BLD VENIPUNCTURE: CPT

## 2024-11-06 PROCEDURE — 85652 RBC SED RATE AUTOMATED: CPT

## 2024-11-06 PROCEDURE — 85025 COMPLETE CBC W/AUTO DIFF WBC: CPT

## 2024-11-06 PROCEDURE — 80053 COMPREHEN METABOLIC PANEL: CPT

## 2024-11-07 NOTE — PROGRESS NOTES
Subjective   Patient ID: [id number]   Colby Cordero III is a 59 y.o. male with Crohn's with IA, HTN, HLD, and BMI 37, presents for follow up on IA     Current IS:  - Cindy, 4/24, did the infusion and now on the shots, every 2 months   - Prednisone 5 mg daily    Prior IS:  - Stelara started in 9/2017 to 4/24, stopped working   - Humira 2013- held in 2014 as was noted to have + quantiferon gold  - Entivyo (Vedolizuman) June 2015 due to recurring GI sx   - SSZ added in 5/2016, no significant improvement, dc  - Remicade 9/16 -> 9/2017, worsening joint pains  - Leflunomide [id number]  - MTX [id number]  - Budesonide  - Prednisone  - Mesalamine      HPI  Reported a flare with Crohn's, did a course of prednisone from 40 mg tapering down  His fecal calprotectin was the lowest in years  Saw podiatry for R ankle MRI  Took steroids, his joints feel great with prednisone, mainly the high doses  Now, reports more aches in his shoulders, elbows and ankles  Mechanical, mainly joints where he has had injuries in the past  No swelling   No morning stiffness   Back pain with activity, saw chiropractor, was worse initially but then was doing home stretching exercises and it got better   Doesn't want to change Cindy, working well for his Crohn's so far   He reports most medications did not help his joints  Only Prednisone works   As the day progresses the pain gets worse  Plantar fasciitis, did release surgery with podiatry  Now has lateral band plantar fasciitis -> injection     Colonoscopy 4/24:  External hemorrhoids  Multiple medium, scattered pancolonic diverticula of moderate severity  Abnormal mucosa, consistent with Crohn's disease. There was patchy inflammation in the sigmoid and descending colon. Normal appearing mucosa between inflammation    Biopsies showed chronic active inflammation   Compliant with meds  No side effects reported  No dactylitis  No IBP   No buttocks pain  No episodes of eye inflammation  No sicca sx  No  chest pain, cough or dyspnea  No rashes  No infections  No psoriasis     ROS:  As per HPI     Rheum hx (Recall from Dr. Freeman's notes):  Initially saw Dr. Khanna for many years at . Then switched care to and saw Dr. Bell at [facility]. He is now transferring care back to , presents to establish care as well as address PCP concerns as noted on referral.      Crohns: diagnosed (mass in colon, removed), recurrent- segmental crohns in L colon ( at least since 8527-1088- initially presented with bowel changes and found to have mass in 2011 (circumferential mass like appears- underwent resection and repeat colonoscopy in Jan 2012 unremarkable. Then, Jan 2013- recurrent GI symptoms, repeat colonoscopy, friable edematous inflammatory changes and dx with segmental Crohns disease.      Pt states for many years now he had been experiencing pain/stiffness in legs, feet, hips, shoulder and arms, and fatigue. About 3-4 weeks ago- he saw his PCP and mentioned above complaints including muscle pain, stiffness, and joint pain. He was recommended to stop Atorvastatin and see if related to it (had been on Atorvastatin for many years). Since stopping he feels about 85-90% better in terms of above sx.   He currently is having issues with plantar fascitis R foot, previous hx on L and needed a procedure  Denies any other current joint pain or swelling  No dactylitis  No significant swelling  No active stiffness currently  Mild soreness in hands, tennis elbow once in while  Intermittent knee pain      Denies fever, unintentional weight loss  Denies hx of uveitis, gets annual eye exam, no hx of psoriasis. No hx of blood clot.     ID HX: Saw Dr. Townsend in 2015 for Latent TB: TB test negative in July 2013, while on Humira: routine + PPD on 12/2014, + quant TB, CXR nml.. Treated with Isoniazid and pyridoxine for latent TB. Hep serology previously negative 2013, 2019 Hep C Ab NR.      FMHX of autoimmune disease: None   PSH: CTS on R, plantar  fascitis on L, colon surgery  SocHX: Current vape, rare ETOH use    Patient Active Problem List   Diagnosis    Abdominal wall abscess    Abdominal bloating    Chronic mastoiditis of both sides    Bilateral chronic otitis media    Chronic suppurative otitis media of right ear    Conductive hearing loss, bilateral    Hearing loss of right ear due to cerumen impaction    Mixed conductive and sensorineural hearing loss of both ears    Mixed conductive and sensorineural hearing loss, unilateral, left ear with restricted hearing on the contralateral side    Sensorineural hearing loss (SNHL) of right ear with restricted hearing of left ear    Crohn's disease of colon    Cuboid syndrome    Current every day smoker    Gout    Enteropathic arthritis associated with Crohn's disease    Fatigue    Hyperglycemia    Hyperlipidemia    Hypertension    Inguinal hernia    Irritable bowel syndrome    Latent tuberculosis infection    Mass of shoulder region    Mechanical pain of right foot    Nevus, atypical    Osteoarthritis of foot joint    Perforation of both tympanic membranes due to otitis media    Myalgia    Obesity    Nontraumatic perforation of tympanic membrane    Peripheral neuropathy    Peroneal tendinitis of left lower extremity    Plantar fascial fibromatosis of left foot    Plantar fasciitis, left    Positive BRANNON (antinuclear antibody)    Sensation of fullness in both ears    Sensorineural hearing loss (SNHL) of left ear    Skin tag, acquired    VALENTINO (obstructive sleep apnea)    Snoring    Stage 3a chronic kidney disease (Multi)    Tinnitus, subjective    Tympanic membrane perforation, left    Vitamin D deficiency    Vitamin B12 deficiency    Plantar fasciitis of right foot    Lateral epicondylitis    PPD positive, treated    Carpal tunnel syndrome on right    Arthralgia    Cigarette nicotine dependence without complication    Prostate cancer screening    Dyslipidemia    Enteropathic arthritis    Hemorrhoids    History of  hypertension     Past Medical History:   Diagnosis Date    Chronic otitis media 10/09/2023    CKD (chronic kidney disease)     stage 3    Crohn's disease, unspecified, without complications (Multi) 08/24/2020    Crohn's disease    Hemorrhage of anus and rectum     Hemorrhage of rectum and anus    Hemorrhage of rectum and anus 01/23/2024    Hyperlipidemia, unspecified     Dyslipidemia    Hypertension     Low back pain 02/19/2024    Otitis media, unspecified, right ear 01/04/2018    Acute otitis media, right    Pain of foot 02/19/2024    Pain of plantar aspect of heel 02/19/2024    Personal history of other diseases of the circulatory system     History of hypertension    Personal history of other diseases of the nervous system and sense organs     History of sleep apnea    Personal history of other diseases of the respiratory system 12/08/2018    History of upper respiratory infection    Personal history of other diseases of the respiratory system     History of bronchitis    Rash 02/19/2024    Comment on above: PERINEAL AREA;    Rash and other nonspecific skin eruption     Rash    Rupture of Achilles tendon 02/19/2024    Sleep apnea     Strain of lumbar region 02/19/2024    Unspecified abdominal pain 08/24/2020    Abdominal cramping    Upper respiratory tract infection 02/19/2024     Past Surgical History:   Procedure Laterality Date    COLONOSCOPY  08/24/2020    Complete Colonoscopy    HERNIA REPAIR  08/24/2020    Hernia Repair    OTHER SURGICAL HISTORY  08/24/2020    Carpal tunnel surgery    OTHER SURGICAL HISTORY  08/24/2020    Tonsillectomy    OTHER SURGICAL HISTORY  08/24/2020    Adenoidectomy    OTHER SURGICAL HISTORY  08/25/2022    Colon surgery    OTHER SURGICAL HISTORY  08/25/2022    Colonoscopy    OTHER SURGICAL HISTORY  08/25/2022    Hernia repair    OTHER SURGICAL HISTORY  08/25/2022    Small bowel resection    SMALL INTESTINE SURGERY  08/24/2020    Small Bowel Resection     Social History      Socioeconomic History    Marital status:      Spouse name: Not on file    Number of children: Not on file    Years of education: Not on file    Highest education level: Not on file   Occupational History    Not on file   Tobacco Use    Smoking status: Every Day    Smokeless tobacco: Current    Tobacco comments:     Vape - everyday    Vaping Use    Vaping status: Every Day   Substance and Sexual Activity    Alcohol use: Not Currently     Comment: occasional    Drug use: Never    Sexual activity: Yes   Other Topics Concern    Not on file   Social History Narrative    Not on file     Social Drivers of Health     Financial Resource Strain: Not on file   Food Insecurity: Not on file   Transportation Needs: Not on file   Physical Activity: Not on file   Stress: Not on file   Social Connections: Not on file   Intimate Partner Violence: Not on file   Housing Stability: Not on file     Allergies   Allergen Reactions    Cephalexin Hives    Atorvastatin Myalgia     Joint pain and fatigue     Current Outpatient Medications:     amLODIPine (Norvasc) 10 mg tablet, Take 1 tablet (10 mg) by mouth once daily., Disp: 90 tablet, Rfl: 3    cyanocobalamin, vitamin B-12, (Vitamin B-12) 1,000 mcg tablet extended release, Take 1 tablet (1,000 mcg) by mouth once daily., Disp: , Rfl:     ergocalciferol (Vitamin D-2) 1.25 MG (98794 UT) capsule, Take 1 capsule (1,250 mcg) by mouth once daily., Disp: , Rfl:     metoprolol succinate XL (Toprol-XL) 100 mg 24 hr tablet, Take 1 tablet (100 mg) by mouth once daily., Disp: 90 tablet, Rfl: 3    risankizumab-rzaa (Skyrizi) 360 mg/2.4 mL (150 mg/mL) wearable injector injection, Inject 2.4 mL (360 mg) under the skin every 8 (eight) weeks. Begin after induction doses at week 12 , and every 8 weeks thereafter, Disp: 2.4 mL, Rfl: 3    valsartan (Diovan) 160 mg tablet, Take 1 tablet (160 mg) by mouth once daily., Disp: 90 tablet, Rfl: 1     Objective   Visit Vitals  /80   Pulse 92   Temp  37.1 °C (98.7 °F)   Resp 20     Physical Exam:  General: AAOx3, Cooperative  Skin: No rashes, ulcers or photosensitive areas  MSK: Upper Extremities:  Hand/Fingers: No erythema, edema, tenderness or warmth at DIP, PIP, or MCP joints, FROM grossly. Good hand . No nodules. No deformities   Wrists: No erythema, edema, warmth or tenderness at wrist, FROM grossly  Elbows: No tenderness, edema, erythema or warmth at elbows, FROM grossly. No nodules   Shoulders: No edema, erythema, tenderness or warmth at shoulders. FROM  Lower Extremities:   Hips: No obvious deformities. No joint tenderness, normal ROM grossly. No trochanteric bursae TTP  Knees: No tenderness, deformities, edema, rashes, or warmth, normal ROM grossly. No crepitus, no pes anserine bursa TTP   Ankles, feet: No deformities, tenderness, edema, erythema, ulceration, or warmth at the ankle or MTP/IP joints, normal ROM grossly  Spine: No spinal tenderness to palpation. No SI joint tenderness     Lab Results   Component Value Date    WBC 8.3 11/06/2024    HGB 14.5 11/06/2024    HCT 43.6 11/06/2024    MCV 88 11/06/2024     11/06/2024        Chemistry    Lab Results   Component Value Date/Time     11/06/2024 1353    K 4.0 11/06/2024 1353     11/06/2024 1353    CO2 29 11/06/2024 1353    BUN 18 11/06/2024 1353    CREATININE 1.50 (H) 11/06/2024 1353    Lab Results   Component Value Date/Time    CALCIUM 9.3 11/06/2024 1353    ALKPHOS 76 11/06/2024 1353    AST 15 11/06/2024 1353    ALT 23 11/06/2024 1353    BILITOT 0.6 11/06/2024 1353        Lab Results   Component Value Date    CRP 0.49 11/06/2024      Lab Results   Component Value Date    SEDRATE 14 11/06/2024      Lab Results   Component Value Date    HEPCAB NON-REACTIVE 04/17/2019      Lab Results   Component Value Date    ALT 23 11/06/2024    AST 15 11/06/2024    ALKPHOS 76 11/06/2024    BILITOT 0.6 11/06/2024     XR foot right 1-2 views  Narrative: Interpreted By:  Tulio Dueñas,   STUDY:  XR  FOOT RIGHT 1-2 VIEWS      INDICATION:  Signs/Symptoms:pitting edema lateral 5th met.      COMPARISON:  June 9, 2023      ACCESSION NUMBER(S):  [id number]      ORDERING CLINICIAN:  ABUNDIO DIEGO      FINDINGS:  Fairly advanced 1st metatarsophalangeal osteoarthritis unchanged.      Large focus of Achilles calcification and midfoot osteoarthritis also  unchanged.      No acute findings.      Impression: Fairly advanced 1st metatarsophalangeal osteoarthritis unchanged.      Large focus of Achilles calcification and midfoot osteoarthritis also  unchanged.      Signed by: Tulio Dueñas 8/7/2024 7:33 PM  Dictation workstation:   [id number]     === 06/09/23 ===  FOOT  1 cm focus of calcification in the distal Achilles. This suggests chronic tendinosis or remote injury.  Advanced right 1st metatarsophalangeal osteoarthritis    CT chest:  1.  Few small bilateral noncalcified pulmonary nodules measuring up to 3 mm, likely benign. Continued screening with low-dose noncontrast chest CT in 12 months (from current date) is recommended.  2. Mild apical emphysema.  3. Mild coronary artery calcifications.      Assessment/Plan    This is a This is a 58 Y M with Crohn's colitis and IA, statin induced myalgia, presenting for follow up  Last seen  by Dr. Freeman in 8/24     Pt with long standing hx of Crohns (currently on Stelara with good control of GI sx), also possible component of IBD related arthritis (joint sx stable), was noted to have recurring myalgias, joint pain without swelling, fatigue, which improved since stopping Atorvastatin (likely statin induced myalgia, normal CK, doubt HMGCR myopathy as sx improved since stopping), no cutaneous manifestations to suggest DM and intact mm strength (doubt PM). Repeat CK nml, aldolase nml, AST/ALT nml. Low titer positive BRANNON and dsDNA ,the most likely explanation is prior TNFi use (Humira and Remicade)- which can cause + BRANNON/dsDNA. No current sx to suggest DILE/SLE. No  photosensitive rash, Raynauds, hx of effusions. Nml C3/C4, histone antibody negative, no cytopenia, nml CK. Workup was recently completed by PCP 6/1/23. ESR/CRP nml. CMP shows mild renal insufficiency (cr 1.4 7,GFR > 55, since 4/2019, of note, no proteinuria or RBC). His current sx are more mechanical, no inflammatory component. He did respond to steroids, but all the previous csDMARDs as well as the biologics (all of them) did not help his joint pains at all. Discussion done with the patient about his condition      Labs:  11/24: Cr 1.5, rest of CMP, CBC, ESR, CRP wnl  8/24: Cr 1.56, rest of CMP, CBC, ESR, CRP wnl. Calprotectin 207  4/24: Colonic biopsy -> chronic active colitis   1/24: Cr 1.61, CRP 1.2, calprotectin 730, rest of CMP, CBC, PSA, ESR wnl  11/23: Cr 1.5  7/28/23 dsDNA 18  6/2023 BRANNON 1:160, dsDNA 24, rest ANGELICA negative. C3/C4 nml. Nml CBC, CK, ESR/CRP, histone Ab, UA + glucose. SPEP no monoclonal protein  11/2018 BRANNON 1:160, dsDNA 13    Imaging:  MRI R ankle:   1.  Mild thickening and increased signal in the proximal plantar fascia central band. Some of this may represent postsurgical changes with plantar fasciitis also considered. Correlate clinically.   2.  Mild tenosynovitis involving the peroneus brevis and longus tendons. Also question of focal tenosynovitis involving the tibialis posterior and flexor digitorum longus tendons.     Xray right foot: Fairly advanced 1st metatarsophalangeal osteoarthritis unchanged. Large focus of Achilles calcification and midfoot osteoarthritis also unchanged    Xray hands, feet and knees 9/22: OA      # Crohns colitis, on Skyrizi per GI  # IBD related arthritis is questionable, likely OA ongoing since initial dx, currently no synovitis, no improvement with DMARDs except predisone  # Myalgia, resolved, likely related to statin use, resolved since stopping. Recommend treatment for HLD with non- statin agents. Nml CK/aldolase, doubt HMGCR myopathy  # Low titer BRANNON,  dsDNA, hx of TNFi usse (Humira [id number], Remicade [id number], first + BRANNON 2018). No current sx/findings to suggest DILE/SLE  # CKD stage III, stable, no proteinuria/RBC  # Vitamin D def  # Diffuse OA      - Continue Skyrizi per GI  - Can't add any csDMARDs as he reports no improvement with them  - Low dose of pred (10 mg for a few weeks until the Cymbalta starts working, if he gets it from PCP, then go down to 5 mg) can help with OA pain as well since nothing is working   - Make Prednisone 10 mg daily for a few weeks then stay on 5 mg  - I do not prescribe long term pain meds, pt declined pain med referral, will see his PCP for Cymbalta   - Unable to use chronic NSAIDs as CKD  - Tylenol upto 2gm as needed  - Discussed sx of photosensitive rash, Raynaud's, worsening renal function/cytopenia, will monitor for SLE manifestations but low clinical/serological concerns      RTC in 6 months    Plan, including risks and benefits, was discussed with the patient, informed on how to reach us.     To schedule an appointment, call [phone]  To reach the rheumatology office, call [phone]    Tiffani Mojica MD      Division of Rheumatology  [facility]

## 2024-11-08 ENCOUNTER — TELEPHONE (OUTPATIENT)
Dept: PRIMARY CARE | Facility: CLINIC | Age: 59
End: 2024-11-08
Payer: COMMERCIAL

## 2024-11-11 DIAGNOSIS — E78.2 MIXED HYPERLIPIDEMIA: ICD-10-CM

## 2024-11-11 DIAGNOSIS — I10 PRIMARY HYPERTENSION: Primary | ICD-10-CM

## 2024-11-11 DIAGNOSIS — Z13.29 THYROID DISORDER SCREENING: ICD-10-CM

## 2024-11-11 DIAGNOSIS — M10.9 GOUT, UNSPECIFIED CAUSE, UNSPECIFIED CHRONICITY, UNSPECIFIED SITE: ICD-10-CM

## 2024-11-11 DIAGNOSIS — E53.8 VITAMIN B12 DEFICIENCY: ICD-10-CM

## 2024-11-11 DIAGNOSIS — E55.9 VITAMIN D DEFICIENCY: ICD-10-CM

## 2024-11-11 DIAGNOSIS — N18.31 STAGE 3A CHRONIC KIDNEY DISEASE (MULTI): ICD-10-CM

## 2024-11-11 NOTE — TELEPHONE ENCOUNTER
I ordered the labs he is due for based upon what he just had complete last week in terms of lab work and what we did last.

## 2024-11-13 ENCOUNTER — HOSPITAL ENCOUNTER (OUTPATIENT)
Dept: RADIOLOGY | Facility: CLINIC | Age: 59
Discharge: HOME | End: 2024-11-13
Payer: COMMERCIAL

## 2024-11-13 ENCOUNTER — APPOINTMENT (OUTPATIENT)
Dept: RHEUMATOLOGY | Facility: CLINIC | Age: 59
End: 2024-11-13
Payer: COMMERCIAL

## 2024-11-13 VITALS
WEIGHT: 279 LBS | SYSTOLIC BLOOD PRESSURE: 142 MMHG | HEART RATE: 92 BPM | DIASTOLIC BLOOD PRESSURE: 80 MMHG | TEMPERATURE: 98.7 F | RESPIRATION RATE: 20 BRPM | BODY MASS INDEX: 37.84 KG/M2

## 2024-11-13 DIAGNOSIS — M19.072 PRIMARY OSTEOARTHRITIS OF BOTH FEET: ICD-10-CM

## 2024-11-13 DIAGNOSIS — M07.60 INFLAMMATORY BOWEL ARTHRITIS: ICD-10-CM

## 2024-11-13 DIAGNOSIS — M19.071 PRIMARY OSTEOARTHRITIS OF BOTH FEET: ICD-10-CM

## 2024-11-13 DIAGNOSIS — N18.31 STAGE 3A CHRONIC KIDNEY DISEASE (MULTI): ICD-10-CM

## 2024-11-13 DIAGNOSIS — Z79.899 ENCOUNTER FOR LONG-TERM (CURRENT) USE OF HIGH-RISK MEDICATION: ICD-10-CM

## 2024-11-13 DIAGNOSIS — M19.042 PRIMARY OSTEOARTHRITIS OF BOTH HANDS: ICD-10-CM

## 2024-11-13 DIAGNOSIS — M17.0 PRIMARY OSTEOARTHRITIS OF BOTH KNEES: ICD-10-CM

## 2024-11-13 DIAGNOSIS — M19.041 PRIMARY OSTEOARTHRITIS OF BOTH HANDS: ICD-10-CM

## 2024-11-13 DIAGNOSIS — K50.10 CROHN'S DISEASE OF COLON WITHOUT COMPLICATION (MULTI): ICD-10-CM

## 2024-11-13 DIAGNOSIS — E55.9 VITAMIN D DEFICIENCY: ICD-10-CM

## 2024-11-13 DIAGNOSIS — K50.10 CROHN'S DISEASE OF COLON WITHOUT COMPLICATION (MULTI): Primary | ICD-10-CM

## 2024-11-13 DIAGNOSIS — K63.9 INFLAMMATORY BOWEL ARTHRITIS: ICD-10-CM

## 2024-11-13 PROCEDURE — 3079F DIAST BP 80-89 MM HG: CPT | Performed by: STUDENT IN AN ORGANIZED HEALTH CARE EDUCATION/TRAINING PROGRAM

## 2024-11-13 PROCEDURE — 3077F SYST BP >= 140 MM HG: CPT | Performed by: STUDENT IN AN ORGANIZED HEALTH CARE EDUCATION/TRAINING PROGRAM

## 2024-11-13 PROCEDURE — 99214 OFFICE O/P EST MOD 30 MIN: CPT | Performed by: STUDENT IN AN ORGANIZED HEALTH CARE EDUCATION/TRAINING PROGRAM

## 2024-11-13 PROCEDURE — 72202 X-RAY EXAM SI JOINTS 3/> VWS: CPT

## 2024-11-13 PROCEDURE — 72100 X-RAY EXAM L-S SPINE 2/3 VWS: CPT

## 2024-11-13 RX ORDER — PREDNISONE 10 MG/1
10 TABLET ORAL DAILY
Qty: 90 TABLET | Refills: 1 | Status: SHIPPED | OUTPATIENT
Start: 2024-11-13 | End: 2025-05-12

## 2024-11-14 ENCOUNTER — LAB (OUTPATIENT)
Dept: LAB | Facility: LAB | Age: 59
End: 2024-11-14
Payer: COMMERCIAL

## 2024-11-14 DIAGNOSIS — N18.31 STAGE 3A CHRONIC KIDNEY DISEASE (MULTI): ICD-10-CM

## 2024-11-14 DIAGNOSIS — Z13.29 THYROID DISORDER SCREENING: ICD-10-CM

## 2024-11-14 DIAGNOSIS — E78.2 MIXED HYPERLIPIDEMIA: ICD-10-CM

## 2024-11-14 DIAGNOSIS — E55.9 VITAMIN D DEFICIENCY: ICD-10-CM

## 2024-11-14 DIAGNOSIS — M10.9 GOUT, UNSPECIFIED CAUSE, UNSPECIFIED CHRONICITY, UNSPECIFIED SITE: ICD-10-CM

## 2024-11-14 DIAGNOSIS — E53.8 VITAMIN B12 DEFICIENCY: ICD-10-CM

## 2024-11-14 LAB
25(OH)D3 SERPL-MCNC: 51 NG/ML (ref 30–100)
CHOLEST SERPL-MCNC: 174 MG/DL (ref 0–199)
CHOLESTEROL/HDL RATIO: 3.6
HDLC SERPL-MCNC: 47.9 MG/DL
LDLC SERPL CALC-MCNC: 103 MG/DL
NON HDL CHOLESTEROL: 126 MG/DL (ref 0–149)
TRIGL SERPL-MCNC: 115 MG/DL (ref 0–149)
TSH SERPL-ACNC: 1.51 MIU/L (ref 0.44–3.98)
URATE SERPL-MCNC: 5.5 MG/DL (ref 4–7.5)
VIT B12 SERPL-MCNC: 662 PG/ML (ref 211–911)
VLDL: 23 MG/DL (ref 0–40)

## 2024-11-14 PROCEDURE — 36415 COLL VENOUS BLD VENIPUNCTURE: CPT

## 2024-11-14 PROCEDURE — 84443 ASSAY THYROID STIM HORMONE: CPT

## 2024-11-14 PROCEDURE — 83970 ASSAY OF PARATHORMONE: CPT

## 2024-11-14 PROCEDURE — 84550 ASSAY OF BLOOD/URIC ACID: CPT

## 2024-11-14 PROCEDURE — 82306 VITAMIN D 25 HYDROXY: CPT

## 2024-11-14 PROCEDURE — 82607 VITAMIN B-12: CPT

## 2024-11-14 PROCEDURE — 80061 LIPID PANEL: CPT

## 2024-11-15 LAB — PTH-INTACT SERPL-MCNC: 56.7 PG/ML (ref 18.5–88)

## 2024-11-21 ENCOUNTER — APPOINTMENT (OUTPATIENT)
Dept: PRIMARY CARE | Facility: CLINIC | Age: 59
End: 2024-11-21
Payer: COMMERCIAL

## 2024-11-21 VITALS
HEART RATE: 54 BPM | WEIGHT: 281.4 LBS | BODY MASS INDEX: 38.11 KG/M2 | TEMPERATURE: 96.6 F | SYSTOLIC BLOOD PRESSURE: 107 MMHG | HEIGHT: 72 IN | DIASTOLIC BLOOD PRESSURE: 58 MMHG

## 2024-11-21 DIAGNOSIS — L72.3 SEBACEOUS CYST: ICD-10-CM

## 2024-11-21 DIAGNOSIS — I10 PRIMARY HYPERTENSION: ICD-10-CM

## 2024-11-21 DIAGNOSIS — F17.210 CIGARETTE NICOTINE DEPENDENCE WITHOUT COMPLICATION: ICD-10-CM

## 2024-11-21 DIAGNOSIS — E78.2 MIXED HYPERLIPIDEMIA: ICD-10-CM

## 2024-11-21 DIAGNOSIS — M79.10 MYALGIA: ICD-10-CM

## 2024-11-21 DIAGNOSIS — N18.31 STAGE 3A CHRONIC KIDNEY DISEASE (MULTI): ICD-10-CM

## 2024-11-21 DIAGNOSIS — M25.50 ARTHRALGIA, UNSPECIFIED JOINT: ICD-10-CM

## 2024-11-21 DIAGNOSIS — L98.9 SKIN LESION OF BACK: ICD-10-CM

## 2024-11-21 DIAGNOSIS — Z00.00 HEALTH MAINTENANCE EXAMINATION: Primary | ICD-10-CM

## 2024-11-21 DIAGNOSIS — Z12.5 PROSTATE CANCER SCREENING: ICD-10-CM

## 2024-11-21 PROCEDURE — 99214 OFFICE O/P EST MOD 30 MIN: CPT | Performed by: INTERNAL MEDICINE

## 2024-11-21 PROCEDURE — 99396 PREV VISIT EST AGE 40-64: CPT | Performed by: INTERNAL MEDICINE

## 2024-11-21 PROCEDURE — 3008F BODY MASS INDEX DOCD: CPT | Performed by: INTERNAL MEDICINE

## 2024-11-21 PROCEDURE — 3074F SYST BP LT 130 MM HG: CPT | Performed by: INTERNAL MEDICINE

## 2024-11-21 PROCEDURE — 3078F DIAST BP <80 MM HG: CPT | Performed by: INTERNAL MEDICINE

## 2024-11-21 RX ORDER — ACETAMINOPHEN 500 MG
5000 TABLET ORAL DAILY
COMMUNITY

## 2024-11-21 RX ORDER — METOPROLOL SUCCINATE 100 MG/1
100 TABLET, EXTENDED RELEASE ORAL DAILY
Qty: 90 TABLET | Refills: 3 | Status: SHIPPED | OUTPATIENT
Start: 2024-11-21 | End: 2025-11-21

## 2024-11-21 RX ORDER — AMLODIPINE BESYLATE 10 MG/1
10 TABLET ORAL DAILY
Qty: 90 TABLET | Refills: 3 | Status: SHIPPED | OUTPATIENT
Start: 2024-11-21 | End: 2025-11-21

## 2024-11-21 RX ORDER — VALSARTAN 160 MG/1
160 TABLET ORAL DAILY
Qty: 90 TABLET | Refills: 1 | Status: SHIPPED | OUTPATIENT
Start: 2024-11-21 | End: 2025-05-20

## 2024-11-21 RX ORDER — DULOXETIN HYDROCHLORIDE 30 MG/1
30 CAPSULE, DELAYED RELEASE ORAL DAILY
Qty: 90 CAPSULE | Refills: 3 | Status: SHIPPED | OUTPATIENT
Start: 2024-11-21 | End: 2025-11-21

## 2024-11-21 ASSESSMENT — PROMIS GLOBAL HEALTH SCALE
EMOTIONAL_PROBLEMS: SOMETIMES
CARRYOUT_SOCIAL_ACTIVITIES: GOOD
RATE_MENTAL_HEALTH: GOOD
RATE_AVERAGE_FATIGUE: MODERATE
RATE_AVERAGE_PAIN: 5
RATE_PHYSICAL_HEALTH: GOOD
CARRYOUT_PHYSICAL_ACTIVITIES: MODERATELY
RATE_GENERAL_HEALTH: FAIR
RATE_SOCIAL_SATISFACTION: GOOD
RATE_QUALITY_OF_LIFE: VERY GOOD

## 2024-11-21 ASSESSMENT — PATIENT HEALTH QUESTIONNAIRE - PHQ9
SUM OF ALL RESPONSES TO PHQ9 QUESTIONS 1 AND 2: 0
1. LITTLE INTEREST OR PLEASURE IN DOING THINGS: NOT AT ALL
2. FEELING DOWN, DEPRESSED OR HOPELESS: NOT AT ALL

## 2024-11-21 NOTE — PROGRESS NOTES
Subjective   Colby Cordero III is a 59 y.o. male and is here for a comprehensive physical exam. The patient reports problems - inflamed gums .    Do you take any herbs or supplements that were not prescribed by a doctor? no  Are you taking calcium supplements? no  Are you taking aspirin daily? no       Patient having inflamed gums and pockets in the gums are getting deeper and the dentist was concerned that his amlodipine could be causing the gum issues or contributing to them. Patient has upcoming colonoscopy and so wants to wait until after the colonoscopy to change the blood pressure medication. Currently blood pressure is looking well.     Patient presents for elevated cholesterol.      Patient is seeing a rheumatologist through  Dr. Shepard. Patient doing own stretches at home from a chiropractor which has helped. Patient was on duloxetine started by rheum in the past and it helped with chronic pain, sleep and general well being.     Patient did complete blood work on 11/14/2024 that was reviewed with the patient today.  Lipid panel showed mild elevation of bad cholesterol with LDL of 103 otherwise was unremarkable, TSH 1.51, vitamin B12 662, vitamin D 51, intact PTH 56.7, uric acid levels 5.5    Patient complains of a recurrent skin cyst on his back.  Noticed it about 6 months ago.  His wife drained it about 3 times by helping him squeeze it.    Patient is up-to-date on age and gender recommended screening.  Patient is up-to-date on age and gender recommended vaccinations exception pneumonia vaccine which he defers.      Review of Systems   Constitutional:  Negative for chills and fever.   HENT:  Negative for sore throat.    Eyes:  Negative for visual disturbance.   Respiratory:  Negative for cough and shortness of breath.    Cardiovascular:  Negative for chest pain and leg swelling.   Gastrointestinal:  Negative for abdominal pain, blood in stool, constipation, diarrhea, nausea and vomiting.    Genitourinary:  Negative for dysuria.   Skin:  Negative for rash.   Neurological:  Negative for dizziness and light-headedness.   Psychiatric/Behavioral:  Negative for agitation and confusion.         Objective   Physical Exam  Vitals and nursing note reviewed.   Constitutional:       General: He is not in acute distress.     Appearance: Normal appearance. He is obese. He is not ill-appearing, toxic-appearing or diaphoretic.   HENT:      Head: Normocephalic and atraumatic.      Mouth/Throat:      Mouth: Mucous membranes are moist.      Pharynx: Oropharynx is clear. No oropharyngeal exudate.   Eyes:      Extraocular Movements: Extraocular movements intact.      Pupils: Pupils are equal, round, and reactive to light.   Cardiovascular:      Rate and Rhythm: Normal rate and regular rhythm.      Heart sounds: Normal heart sounds.   Pulmonary:      Effort: Pulmonary effort is normal. No respiratory distress.      Breath sounds: Normal breath sounds. No wheezing, rhonchi or rales.   Abdominal:      General: Bowel sounds are normal. There is no distension.      Palpations: Abdomen is soft. There is no mass.      Tenderness: There is no abdominal tenderness. There is no guarding.   Musculoskeletal:      Cervical back: Neck supple.      Right lower leg: No edema.      Left lower leg: No edema.   Lymphadenopathy:      Cervical: No cervical adenopathy.   Skin:     General: Skin is warm and dry.      Coloration: Skin is not jaundiced or pale.      Findings: No rash.   Neurological:      General: No focal deficit present.      Mental Status: He is alert and oriented to person, place, and time.      Cranial Nerves: No cranial nerve deficit.   Psychiatric:         Mood and Affect: Mood normal.         Behavior: Behavior normal.         Thought Content: Thought content normal.         Judgment: Judgment normal.         Assessment/Plan   Healthy male exam.      1.  Patient up-to-date with age and gender recommend screening.   Patient up-to-date on age and gender recommended immunizations with exception of pneumonia vaccine which he defers at this time.    2. Patient Counseling:  --Nutrition: Stressed importance of moderation in sodium/caffeine intake, saturated fat and cholesterol, caloric balance, sufficient intake of fresh fruits, vegetables, fiber, calcium, iron, and 1 mg of folate supplement per day (for females capable of pregnancy).  --Discussed the issue of estrogen replacement, calcium supplement, and the daily use of baby aspirin.  --Exercise: Stressed the importance of regular exercise.   --Substance Abuse: Discussed cessation/primary prevention of tobacco, alcohol, or other drug use; driving or other dangerous activities under the influence; availability of treatment for abuse.    --Sexuality: Discussed sexually transmitted diseases, partner selection, use of condoms, avoidance of unintended pregnancy  and contraceptive alternatives.   --Injury prevention: Discussed safety belts, safety helmets, smoke detector, smoking near bedding or upholstery.   --Dental health: Discussed importance of regular tooth brushing, flossing, and dental visits.  --Immunizations reviewed.  --Discussed benefits of screening colonoscopy.  --After hours service discussed with patient  3. Discussed the patient's BMI with him.  The BMI is above average. The patient received Provided instructions on dietary changes  Provided instructions on exercise because they have an above normal BMI.  4. Follow up in 6 months    Arthralgia/myalgia: Currently on duloxetine and had improvement in the symptoms which will be continued.    Hypertension: Chronic, stable.  For now, continue amlodipine, metoprolol, valsartan hand.  He may want to make a switch of amlodipine due to gum issues.  Wants to wait till after he has surgery.    Hyperlipidemia: Has an ASCVD risk score of 9.4%.  Does not want to be on statin therapy at this time.  Had adverse reaction to atorvastatin  in the past with joint pain and fatigue.  Total cholesterol 174 LDL was 103 HDL was 47.9.    Stage IIIa chronic kidney disease: Chronic, stable continue to monitor    Sebaceous cyst/skin lesion of back: Patient will be referred to general surgery for further evaluation and treatment.

## 2024-11-22 ENCOUNTER — ANESTHESIA EVENT (OUTPATIENT)
Dept: GASTROENTEROLOGY | Facility: EXTERNAL LOCATION | Age: 59
End: 2024-11-22

## 2024-12-04 ENCOUNTER — APPOINTMENT (OUTPATIENT)
Dept: GASTROENTEROLOGY | Facility: EXTERNAL LOCATION | Age: 59
End: 2024-12-04
Payer: COMMERCIAL

## 2024-12-04 ENCOUNTER — ANESTHESIA (OUTPATIENT)
Dept: GASTROENTEROLOGY | Facility: EXTERNAL LOCATION | Age: 59
End: 2024-12-04

## 2024-12-04 VITALS
OXYGEN SATURATION: 96 % | SYSTOLIC BLOOD PRESSURE: 105 MMHG | TEMPERATURE: 96.8 F | DIASTOLIC BLOOD PRESSURE: 72 MMHG | HEART RATE: 75 BPM | RESPIRATION RATE: 17 BRPM

## 2024-12-04 DIAGNOSIS — K50.119 CROHN'S DISEASE OF COLON WITH COMPLICATION (MULTI): ICD-10-CM

## 2024-12-04 PROCEDURE — 45378 DIAGNOSTIC COLONOSCOPY: CPT | Performed by: INTERNAL MEDICINE

## 2024-12-04 RX ORDER — PROPOFOL 10 MG/ML
INJECTION, EMULSION INTRAVENOUS AS NEEDED
Status: DISCONTINUED | OUTPATIENT
Start: 2024-12-04 | End: 2024-12-04

## 2024-12-04 RX ORDER — ONDANSETRON HYDROCHLORIDE 2 MG/ML
4 INJECTION, SOLUTION INTRAVENOUS ONCE AS NEEDED
Status: DISCONTINUED | OUTPATIENT
Start: 2024-12-04 | End: 2024-12-05 | Stop reason: HOSPADM

## 2024-12-04 SDOH — HEALTH STABILITY: MENTAL HEALTH: CURRENT SMOKER: 0

## 2024-12-04 ASSESSMENT — PAIN SCALES - GENERAL
PAIN_LEVEL: 0
PAINLEVEL_OUTOF10: 0 - NO PAIN

## 2024-12-04 ASSESSMENT — PAIN - FUNCTIONAL ASSESSMENT
PAIN_FUNCTIONAL_ASSESSMENT: 0-10

## 2024-12-04 NOTE — H&P
Outpatient Hospital Procedure    Patient Profile-Procedures  Initial Info  Patient Demographics  Name Colby Cordero III  Date of Birth 1965  MRN [id number]  Address   [address] [id number] [facility] [employer]  [place] [address]    Primary Phone Number [phone]  Secondary Phone Number    Denny Rasheed    Procedures   Colonoscopy      Indication:  Segmental Crohns - sigmoid resection - failed Humira, Stelara, now on Skyrizi since 5/24  Currently on prednisone 10mg daily for joint pain  Feeling better overall    Primary contact name and number   Extended Emergency Contact Information  Primary Emergency Contact: DAVID THORNE  Mobile Phone: [phone]  Relation: Spouse  Preferred language: English   needed? No  Secondary Emergency Contact: Mohinder Cordero  Mobile Phone: [phone]  Relation: Child  Preferred language: English   needed? No    General Health  Weight There were no vitals filed for this visit.  BMI There is no height or weight on file to calculate BMI.    Allergies  Allergies   Allergen Reactions    Cephalexin Hives    Atorvastatin Myalgia     Joint pain and fatigue       Past Medical History   Past Medical History:   Diagnosis Date    Chronic otitis media 10/09/2023    CKD (chronic kidney disease)     stage 3    Crohn's disease, unspecified, without complications (Multi) 08/24/2020    Crohn's disease    Hemorrhage of anus and rectum     Hemorrhage of rectum and anus    Hemorrhage of rectum and anus 01/23/2024    Hyperlipidemia, unspecified     Dyslipidemia    Hypertension     Low back pain 02/19/2024    Otitis media, unspecified, right ear 01/04/2018    Acute otitis media, right    Pain of foot 02/19/2024    Pain of plantar aspect of heel 02/19/2024    Personal history of other diseases of the circulatory system     History of hypertension    Personal history of other diseases of the nervous system and sense organs     History of sleep apnea    Personal history  of other diseases of the respiratory system 12/08/2018    History of upper respiratory infection    Personal history of other diseases of the respiratory system     History of bronchitis    Rash 02/19/2024    Comment on above: PERINEAL AREA;    Rash and other nonspecific skin eruption     Rash    Rupture of Achilles tendon 02/19/2024    Sleep apnea     Strain of lumbar region 02/19/2024    Unspecified abdominal pain 08/24/2020    Abdominal cramping    Upper respiratory tract infection 02/19/2024       Provider assessment  Diagnosis  Medication Reviewed - yes  Prior to Admission medications    Medication Sig Start Date End Date Taking? Authorizing Provider   amLODIPine (Norvasc) 10 mg tablet Take 1 tablet (10 mg) by mouth once daily. 11/21/24 11/21/25 Yes Denny Darby DO   cholecalciferol (Vitamin D3) 5,000 Units tablet Take 1 tablet (5,000 Units) by mouth once daily.   Yes Historical Provider, MD   cyanocobalamin, vitamin B-12, (Vitamin B-12) 1,000 mcg tablet extended release Take 1 tablet (1,000 mcg) by mouth once daily. 6/9/23  Yes Historical Provider, MD   DULoxetine (Cymbalta) 30 mg DR capsule Take 1 capsule (30 mg) by mouth once daily. Do not crush or chew. 11/21/24 11/21/25 Yes Denny Darby DO   ergocalciferol (Vitamin D-2) 1.25 MG (32850 UT) capsule Take 1 capsule (1,250 mcg) by mouth once daily. 4/18/19  Yes Historical Provider, MD   metoprolol succinate XL (Toprol-XL) 100 mg 24 hr tablet Take 1 tablet (100 mg) by mouth once daily. 11/21/24 11/21/25 Yes Denny Darby DO   predniSONE (Deltasone) 10 mg tablet Take 1 tablet (10 mg) by mouth once daily. 11/13/24 5/12/25 Yes Tiffani Mojica MD   risankizumab-rzaa (Skyrizi) 360 mg/2.4 mL (150 mg/mL) wearable injector injection Inject 2.4 mL (360 mg) under the skin every 8 (eight) weeks. Begin after induction doses at week 12 , and every 8 weeks thereafter 5/7/24  Yes Judy MONDRAGON MD   valsartan (Diovan) 160 mg tablet Take 1 tablet (160 mg) by  mouth once daily. 11/21/24 5/20/25 Yes Denny Darby, DO       This is my H&P    Physical Exam  Physical Exam  Constitutional:       Comments: Awake   HENT:      Head: Normocephalic.   Cardiovascular:      Rate and Rhythm: Normal rate and regular rhythm.   Pulmonary:      Effort: Pulmonary effort is normal.      Breath sounds: Normal breath sounds.   Abdominal:      General: Bowel sounds are normal.      Palpations: Abdomen is soft.   Neurological:      Mental Status: He is alert.   Psychiatric:         Mood and Affect: Mood normal.           Oropharyngeal Classification III (soft and hard palate and base of uvula visible)  ASA PS Classification 3  Sedation Plan Deep  Procedure Plan - pre-procedural (re)assesment completed by physician:  discharge/transfer patient when discharge criteria met    Judy Muniz MD  12/4/2024 9:38 AM

## 2024-12-04 NOTE — Clinical Note
Patient tolerated the procedure well and is comfortable with no complaints of pain. Vital signs stable. Arousable prior to transport. Patient transported to PACU via stretcher. Handoff completed. Abdomen soft non disteneded.

## 2024-12-04 NOTE — DISCHARGE INSTRUCTIONS
Patient Instructions Post Endoscopy Procedure      The anesthetics, sedatives or narcotics which were given to you today will be acting in your body for the next 24 hours, so you might feel a little sleepy or groggy.  This feeling should slowly wear off. Carefully read and follow the instructions.     You received sedation today:  - Do not drive or operate any machinery or power tools of any kind.   - No alcoholic beverages today, not even beer or wine.  - Do not make any important decisions or sign any legal documents.  - No over the counter medications that contain alcohol or that may cause drowsiness.    While it is common to experience mild to moderate abdominal distention, gas, or belching after your procedure, if any of these symptoms occur following discharge from the GI Lab or within one week of having your procedure, call the [facility] to be advised whether a visit to your nearest Urgent Care or Emergency Department is indicated.  Take this paper with you if you go.   - If you develop an allergic reaction to the medications that were given during your procedure such as difficulty breathing, rash, hives, severe nausea, vomiting or lightheadedness.  - If you experience chest pain, shortness of breath, severe abdominal pain, fevers and chills.  -If you develop signs and symptoms of bleeding such as blood in your spit, if your stools turn black, tarry, or bloody  - If you have not urinated within 8 hours following your procedure.  - If your IV site becomes painful, red, inflamed, or looks infected.      Your physician recommends the additional following instructions:    -You have a contact number available for emergencies. The signs and symptoms of potential delayed complications were discussed with you. You may return to normal activities tomorrow.  -Resume your previous diet or other if specified.  -Continue your present medications.   -We are waiting for your pathology results, if  applicable. The results will be available in [employer]. I will send you a message with any recommendations.  -The findings and recommendations have been discussed with you and/or family.  -Please see Medication Reconciliation Form for new medication/medications prescribed.     If you experience any problems or have any questions following discharge from the GI Lab, please call: [phone] from 7 am- 4:30 pm.  In the event of an emergency please go to the closest Emergency Department or call Dr. Muniz at [phone]

## 2024-12-04 NOTE — ANESTHESIA POSTPROCEDURE EVALUATION
Patient: Colby Cordero III    Procedure Summary       Date: 12/04/24 Room / Location: [facility]    Anesthesia Start: 0938 Anesthesia Stop: 1000    Procedure: COLONOSCOPY Diagnosis: Crohn's disease of colon with complication (Multi)    Scheduled Providers: Judy MONDRAGON MD Responsible Provider: CLAY Jorgensen    Anesthesia Type: MAC ASA Status: 2            Anesthesia Type: MAC    Vitals Value Taken Time   BP 81/58 12/04/24 1000   Temp 36 °C (96.8 °F) 12/04/24 1000   Pulse 95 12/04/24 1000   Resp 17 12/04/24 1000   SpO2 95 % 12/04/24 1000       Anesthesia Post Evaluation    Patient location during evaluation: bedside  Patient participation: complete - patient participated  Level of consciousness: awake and alert  Pain score: 0  Pain management: adequate  Airway patency: patent  Cardiovascular status: acceptable  Respiratory status: acceptable  Hydration status: acceptable  Postoperative Nausea and Vomiting: none        No notable events documented.

## 2024-12-04 NOTE — ANESTHESIA PREPROCEDURE EVALUATION
Patient: Colby Cordero III    Procedure Information       Date/Time: 12/04/24 0930    Scheduled providers: Judy MONDRAGON MD    Procedure: COLONOSCOPY    Location: [facility]            Relevant Problems   Cardiac   (+) Hyperlipidemia   (+) Hypertension      Pulmonary   (+) VALENTINO (obstructive sleep apnea)      Neuro   (+) Carpal tunnel syndrome on right   (+) Peripheral neuropathy      GI   (+) Crohn's disease of colon   (+) Enteropathic arthritis associated with Crohn's disease   (+) Irritable bowel syndrome      Endocrine   (+) Obesity      Musculoskeletal   (+) Carpal tunnel syndrome on right   (+) Osteoarthritis of foot joint      HEENT   (+) Conductive hearing loss, bilateral   (+) Hearing loss of right ear due to cerumen impaction   (+) Mixed conductive and sensorineural hearing loss of both ears   (+) Mixed conductive and sensorineural hearing loss, unilateral, left ear with restricted hearing on the contralateral side   (+) Sensorineural hearing loss (SNHL) of left ear   (+) Sensorineural hearing loss (SNHL) of right ear with restricted hearing of left ear      ID   (+) Chronic suppurative otitis media of right ear       Clinical information reviewed:   Tobacco  Allergies  Meds   Med Hx  Surg Hx   Fam Hx  Soc Hx      There were no vitals filed for this visit.    Past Surgical History:   Procedure Laterality Date    COLONOSCOPY  08/24/2020    Complete Colonoscopy    HERNIA REPAIR  08/24/2020    Hernia Repair    OTHER SURGICAL HISTORY  08/24/2020    Carpal tunnel surgery    OTHER SURGICAL HISTORY  08/24/2020    Tonsillectomy    OTHER SURGICAL HISTORY  08/24/2020    Adenoidectomy    OTHER SURGICAL HISTORY  08/25/2022    Colon surgery    OTHER SURGICAL HISTORY  08/25/2022    Colonoscopy    OTHER SURGICAL HISTORY  08/25/2022    Hernia repair    OTHER SURGICAL HISTORY  08/25/2022    Small bowel resection    SMALL INTESTINE SURGERY  08/24/2020    Small Bowel Resection     Past Medical  History:   Diagnosis Date    Chronic otitis media 10/09/2023    CKD (chronic kidney disease)     stage 3    Crohn's disease, unspecified, without complications (Multi) 08/24/2020    Crohn's disease    Hemorrhage of anus and rectum     Hemorrhage of rectum and anus    Hemorrhage of rectum and anus 01/23/2024    Hyperlipidemia, unspecified     Dyslipidemia    Hypertension     Low back pain 02/19/2024    Otitis media, unspecified, right ear 01/04/2018    Acute otitis media, right    Pain of foot 02/19/2024    Pain of plantar aspect of heel 02/19/2024    Personal history of other diseases of the circulatory system     History of hypertension    Personal history of other diseases of the nervous system and sense organs     History of sleep apnea    Personal history of other diseases of the respiratory system 12/08/2018    History of upper respiratory infection    Personal history of other diseases of the respiratory system     History of bronchitis    Rash 02/19/2024    Comment on above: PERINEAL AREA;    Rash and other nonspecific skin eruption     Rash    Rupture of Achilles tendon 02/19/2024    Sleep apnea     Strain of lumbar region 02/19/2024    Unspecified abdominal pain 08/24/2020    Abdominal cramping    Upper respiratory tract infection 02/19/2024       Current Outpatient Medications:     amLODIPine (Norvasc) 10 mg tablet, Take 1 tablet (10 mg) by mouth once daily., Disp: 90 tablet, Rfl: 3    cholecalciferol (Vitamin D3) 5,000 Units tablet, Take 1 tablet (5,000 Units) by mouth once daily., Disp: , Rfl:     cyanocobalamin, vitamin B-12, (Vitamin B-12) 1,000 mcg tablet extended release, Take 1 tablet (1,000 mcg) by mouth once daily., Disp: , Rfl:     DULoxetine (Cymbalta) 30 mg DR capsule, Take 1 capsule (30 mg) by mouth once daily. Do not crush or chew., Disp: 90 capsule, Rfl: 3    ergocalciferol (Vitamin D-2) 1.25 MG (27266 UT) capsule, Take 1 capsule (1,250 mcg) by mouth once daily., Disp: , Rfl:     metoprolol  succinate XL (Toprol-XL) 100 mg 24 hr tablet, Take 1 tablet (100 mg) by mouth once daily., Disp: 90 tablet, Rfl: 3    predniSONE (Deltasone) 10 mg tablet, Take 1 tablet (10 mg) by mouth once daily., Disp: 90 tablet, Rfl: 1    risankizumab-rzaa (Skyrizi) 360 mg/2.4 mL (150 mg/mL) wearable injector injection, Inject 2.4 mL (360 mg) under the skin every 8 (eight) weeks. Begin after induction doses at week 12 , and every 8 weeks thereafter, Disp: 2.4 mL, Rfl: 3    valsartan (Diovan) 160 mg tablet, Take 1 tablet (160 mg) by mouth once daily., Disp: 90 tablet, Rfl: 1  Prior to Admission medications    Medication Sig Start Date End Date Taking? Authorizing Provider   amLODIPine (Norvasc) 10 mg tablet Take 1 tablet (10 mg) by mouth once daily. 11/21/24 11/21/25 Yes Denny Darby, DO   cholecalciferol (Vitamin D3) 5,000 Units tablet Take 1 tablet (5,000 Units) by mouth once daily.   Yes Historical Provider, MD   cyanocobalamin, vitamin B-12, (Vitamin B-12) 1,000 mcg tablet extended release Take 1 tablet (1,000 mcg) by mouth once daily. 6/9/23  Yes Historical Provider, MD   DULoxetine (Cymbalta) 30 mg DR capsule Take 1 capsule (30 mg) by mouth once daily. Do not crush or chew. 11/21/24 11/21/25 Yes Denny Darby,    ergocalciferol (Vitamin D-2) 1.25 MG (87675 UT) capsule Take 1 capsule (1,250 mcg) by mouth once daily. 4/18/19  Yes Historical Provider, MD   metoprolol succinate XL (Toprol-XL) 100 mg 24 hr tablet Take 1 tablet (100 mg) by mouth once daily. 11/21/24 11/21/25 Yes eDnny Darby DO   predniSONE (Deltasone) 10 mg tablet Take 1 tablet (10 mg) by mouth once daily. 11/13/24 5/12/25 Yes Tiffani Mojica MD   risankizumab-rzaa (Skyrizi) 360 mg/2.4 mL (150 mg/mL) wearable injector injection Inject 2.4 mL (360 mg) under the skin every 8 (eight) weeks. Begin after induction doses at week 12 , and every 8 weeks thereafter 5/7/24  Yes Judy MONDRAGON MD   valsartan (Diovan) 160 mg tablet Take 1 tablet (160 mg) by  "mouth once daily. 11/21/24 5/20/25 Yes Denny Darby, DO     Allergies   Allergen Reactions    Cephalexin Hives    Atorvastatin Myalgia     Joint pain and fatigue     Social History     Tobacco Use    Smoking status: Former     Types: Cigarettes    Smokeless tobacco: Never    Tobacco comments:     Vape - everyday    Substance Use Topics    Alcohol use: Yes     Alcohol/week: 1.0 standard drink of alcohol     Types: 1 Standard drinks or equivalent per week     Comment: maybe 1  a month         Chemistry    Lab Results   Component Value Date/Time     11/06/2024 1353    K 4.0 11/06/2024 1353     11/06/2024 1353    CO2 29 11/06/2024 1353    BUN 18 11/06/2024 1353    CREATININE 1.50 (H) 11/06/2024 1353    Lab Results   Component Value Date/Time    CALCIUM 9.3 11/06/2024 1353    ALKPHOS 76 11/06/2024 1353    AST 15 11/06/2024 1353    ALT 23 11/06/2024 1353    BILITOT 0.6 11/06/2024 1353          Lab Results   Component Value Date/Time    WBC 8.3 11/06/2024 1353    HGB 14.5 11/06/2024 1353    HCT 43.6 11/06/2024 1353     11/06/2024 1353     No results found for: \"PROTIME\", \"PTT\", \"INR\"  No results found for this or any previous visit (from the past 4464 hours).    NPO Detail:  NPO/Void Status  Date of Last Liquid: 12/04/24  Time of Last Liquid: 0300  Date of Last Solid: 12/02/24  Time of Last Solid: 1800  Last Intake Type: Clear fluids         Physical Exam    Airway  Mallampati: III  TM distance: >3 FB     Cardiovascular - normal exam     Dental - normal exam     Pulmonary - normal exam     Abdominal            Anesthesia Plan    History of general anesthesia?: yes  History of complications of general anesthesia?: no    ASA 2     MAC     The patient is not a current smoker.  Patient was previously instructed to abstain from smoking on day of procedure.    intravenous induction   Anesthetic plan and risks discussed with patient.    Plan discussed with attending.      "

## 2024-12-16 ENCOUNTER — APPOINTMENT (OUTPATIENT)
Dept: SURGERY | Facility: CLINIC | Age: 59
End: 2024-12-16
Payer: COMMERCIAL

## 2024-12-16 VITALS
OXYGEN SATURATION: 94 % | SYSTOLIC BLOOD PRESSURE: 138 MMHG | BODY MASS INDEX: 37.8 KG/M2 | WEIGHT: 270 LBS | HEIGHT: 71 IN | DIASTOLIC BLOOD PRESSURE: 81 MMHG | HEART RATE: 50 BPM

## 2024-12-16 DIAGNOSIS — L98.9 SKIN LESION OF BACK: ICD-10-CM

## 2024-12-16 DIAGNOSIS — L72.3 SEBACEOUS CYST: ICD-10-CM

## 2024-12-16 PROCEDURE — 3079F DIAST BP 80-89 MM HG: CPT | Performed by: SURGERY

## 2024-12-16 PROCEDURE — 99202 OFFICE O/P NEW SF 15 MIN: CPT | Performed by: SURGERY

## 2024-12-16 PROCEDURE — 3075F SYST BP GE 130 - 139MM HG: CPT | Performed by: SURGERY

## 2024-12-16 PROCEDURE — 3008F BODY MASS INDEX DOCD: CPT | Performed by: SURGERY

## 2024-12-16 NOTE — PROGRESS NOTES
"Subjective   Patient ID: Colby Cordero III is a 59 y.o. male who presents for No chief complaint on file..  HPI  59-year-old male patient referred for back sebaceous cyst.  He noticed the cyst 6 months ago and wife has drained it three times. \"When it gets big, she squeezes it to get the fluid out\". Last drainage was 3 wks ago. Denies trauma to the back.  Denies fevers and chills.  He vapes.      Objective   Physical Exam  Gen: no acute distress  Cv: RRR  Pulm: CTAB  Back: 2x3 mm dark spot consistent with cyst opening   Assessment/Plan   Recurrent infected back sebaceous cyst. He will undergo excision in the office. I explained to him the procedure, risks and complications. All questions answered.          Aren Ackerman MD 12/16/24 10:46 AM   "

## 2024-12-28 DIAGNOSIS — K50.118 CROHN'S DISEASE OF COLON WITH OTHER COMPLICATION: ICD-10-CM

## 2024-12-30 ENCOUNTER — APPOINTMENT (OUTPATIENT)
Dept: SURGERY | Facility: CLINIC | Age: 59
End: 2024-12-30
Payer: COMMERCIAL

## 2024-12-30 DIAGNOSIS — L72.3 SEBACEOUS CYST: Primary | ICD-10-CM

## 2024-12-30 PROCEDURE — 12032 INTMD RPR S/A/T/EXT 2.6-7.5: CPT | Performed by: SURGERY

## 2024-12-30 PROCEDURE — 11423 EXC H-F-NK-SP B9+MARG 2.1-3: CPT | Performed by: SURGERY

## 2024-12-30 PROCEDURE — 88304 TISSUE EXAM BY PATHOLOGIST: CPT

## 2024-12-30 PROCEDURE — 88304 TISSUE EXAM BY PATHOLOGIST: CPT | Performed by: PATHOLOGY

## 2024-12-30 PROCEDURE — 0752T DGTZ GLS MCRSCP SLD LVL III: CPT

## 2024-12-30 RX ORDER — RISANKIZUMAB-RZAA 360 MG/2.4
WEARABLE INJECTOR SUBCUTANEOUS
Qty: 1 ML | Refills: 4 | Status: SHIPPED | OUTPATIENT
Start: 2024-12-30

## 2024-12-30 NOTE — PROGRESS NOTES
Patient ID: Colby Cordero III is a 59 y.o. male.    Procedures  Procedure: excision of recurrent, infected upper back sebaceous cyst  Indication: recurrent, infected upper back sebaceous cyst  Alternatives, risks and complications including but not limited to bleeding and infection were discussed with the patient. Written consent was obtained prior to the procedure.  After informed consent was obtained, the surrounding skin was prepped and draped in the usual sterile fashion using chloroprep. 1.5 x 1 cm sebaceous cyst noted.The surrounding skin was anesthetized with 1% lidocaine with epi (total of 20 ml). Using the 15 blade, a 4 cm elliptical incision was made around the cyst, through the skin and subcutaneous tissue.  The lesion was excised, passed off the field and sent to pathology. The specimen measures 3.5 x 3 cm. Hemostasis was obtained with suture ligation with 3-0 vicryl suture. The 4 cm defect was closed in layers: deep dermal tissue approximated with multiple 3-0 Vicryl in a simple interrupted fashion.  Skin approximated with 3-0 Vicryl in a subcuticular fashion and multiple horizontal mattress sutures. Mastisol, sterile strips, 4x4 gauze and Tegaderm placed. There was minimal bleeding and patient tolerated the procedure with no immediate complication. The patient is given instruction about wound care. Remove the dressing in 3 days; remove steri-strips in 1 week. Call for redness, drainage, fevers or worsening pain.

## 2025-01-02 LAB
LABORATORY COMMENT REPORT: NORMAL
PATH REPORT.FINAL DX SPEC: NORMAL
PATH REPORT.GROSS SPEC: NORMAL
PATH REPORT.RELEVANT HX SPEC: NORMAL
PATH REPORT.TOTAL CANCER: NORMAL

## 2025-01-20 ENCOUNTER — APPOINTMENT (OUTPATIENT)
Dept: SURGERY | Facility: CLINIC | Age: 60
End: 2025-01-20
Payer: COMMERCIAL

## 2025-01-27 ENCOUNTER — OFFICE VISIT (OUTPATIENT)
Dept: SURGERY | Facility: CLINIC | Age: 60
End: 2025-01-27
Payer: COMMERCIAL

## 2025-01-27 VITALS — BODY MASS INDEX: 37.24 KG/M2 | WEIGHT: 266 LBS | HEIGHT: 71 IN

## 2025-01-27 DIAGNOSIS — Z51.89 VISIT FOR WOUND CHECK: Primary | ICD-10-CM

## 2025-01-27 PROCEDURE — 3008F BODY MASS INDEX DOCD: CPT | Performed by: SURGERY

## 2025-01-27 PROCEDURE — 1036F TOBACCO NON-USER: CPT | Performed by: SURGERY

## 2025-01-27 PROCEDURE — 99212 OFFICE O/P EST SF 10 MIN: CPT | Performed by: SURGERY

## 2025-01-27 NOTE — PROGRESS NOTES
Subjective   Patient ID: Colby Cordero III is a 59 y.o. male who presents for Follow-up.  HPI  59-year-old male patient who underwent excision of recurrent, infected upper back sebaceous cyst on December 30.  Pathology showed epidermal inclusion cyst, inflamed with abscess. Here in followup and doing well       Objective   Physical Exam  Gen: no acute distress  CV: RRR  Pulm: CTAB  Back: wound dehisced at upper portion with intact skin sutures at lower portion; no drainage, no infection   Assessment/Plan   Patient continues to do well.  No recurrent infection.  Wound is healing. Followup as needed         Aren Ackerman MD 01/27/25 2:38 PM

## 2025-02-03 ENCOUNTER — APPOINTMENT (OUTPATIENT)
Dept: GASTROENTEROLOGY | Facility: CLINIC | Age: 60
End: 2025-02-03
Payer: COMMERCIAL

## 2025-02-03 VITALS
SYSTOLIC BLOOD PRESSURE: 138 MMHG | DIASTOLIC BLOOD PRESSURE: 80 MMHG | WEIGHT: 280 LBS | RESPIRATION RATE: 18 BRPM | HEIGHT: 71 IN | OXYGEN SATURATION: 95 % | TEMPERATURE: 97.1 F | BODY MASS INDEX: 39.2 KG/M2 | HEART RATE: 64 BPM

## 2025-02-03 DIAGNOSIS — K50.10 CROHN'S DISEASE OF COLON WITHOUT COMPLICATION (MULTI): Primary | ICD-10-CM

## 2025-02-03 PROCEDURE — 99213 OFFICE O/P EST LOW 20 MIN: CPT | Performed by: NURSE PRACTITIONER

## 2025-02-03 PROCEDURE — 3008F BODY MASS INDEX DOCD: CPT | Performed by: NURSE PRACTITIONER

## 2025-02-03 PROCEDURE — 3075F SYST BP GE 130 - 139MM HG: CPT | Performed by: NURSE PRACTITIONER

## 2025-02-03 PROCEDURE — 3079F DIAST BP 80-89 MM HG: CPT | Performed by: NURSE PRACTITIONER

## 2025-02-03 NOTE — PROGRESS NOTES
Subjective   Patient ID: Colby Cordero III is a 59 y.o. male who presents for Follow-up.  [facility] Dr. Muniz 7/30/24:  58-year-old male diagnosed with Crohn's disease In 2011.  He is status post sigmoid resection.  He has previously failed Humira Remicade and Stelara.  He is now been on Skyrizi since May of this year and completed his first on box injection in July.  He reports the past 2 weeks he has felt the best than he ever has.  His most recent colonoscopy prior to the transition from Stelara to Skyrizi noted and SCS Crohn score of 17.  He continues to have joint pain.  He has seen rheumatology in the past but most of the medicines they put him on did not improve his symptoms.  He is currently taking NSAIDs either Aleve or ibuprofen 5 times a week.   -Referral to rheumatology for evaluation of his joint pain  -Repeat colonoscopy in December of this year to assess disease on the Stelara.  He was given a sample of Clenpiq prep  -I have advised him to start an omeprazole over-the-counter daily given that he is taking NSAIDs 5 days a week  -I encouraged him to quit smoking and vaping as well as diet and exercise to control his weight  -He will obtain blood work  I will see him in follow-up in 6 months      ->Colonoscopy 12/4/24: SES-CD score 3; biopsies are consistent with endoscopic findings of inflammation in the descending colon and rectum, continue Skyrizi.  Scattered diverticulosis of moderate severity  Hemorrhoids  Healthy end-to-end colocolonic anastomosis in the rectosigmoid  Abnormal mucosa, consistent with Crohn's disease  Single small angioectasia in the ascending colon     [facility] follow-up 2/3/2025:  Patient here for IBD follow-up.  He is having 3-4 soft stools per day, but does not feel fully evacuated and endorses abdominal bloating.  No abdominal pain.  No melena or hematochezia.  No nocturnal bowel movements.  Continues to smoke daily.  He has established with rheumatology for his joint pain, no  new joint pain.  Rheumatology feels his pain is OA.  No new skin rashes or lesions.  No eye infections or vision changes.  No paresthesias.  Overall his GI symptoms have resolved with Skyrizi.  And appetite are stable.  No GERD, nausea or vomiting.  His next Skyrizi injection is 3/11/2025.  Current Outpatient Medications on File Prior to Visit   Medication Sig Dispense Refill    amLODIPine (Norvasc) 10 mg tablet Take 1 tablet (10 mg) by mouth once daily. 90 tablet 3    cholecalciferol (Vitamin D3) 5,000 Units tablet Take 1 tablet (5,000 Units) by mouth once daily.      cyanocobalamin, vitamin B-12, (Vitamin B-12) 1,000 mcg tablet extended release Take 1 tablet (1,000 mcg) by mouth once daily.      DULoxetine (Cymbalta) 30 mg DR capsule Take 1 capsule (30 mg) by mouth once daily. Do not crush or chew. 90 capsule 3    metoprolol succinate XL (Toprol-XL) 100 mg 24 hr tablet Take 1 tablet (100 mg) by mouth once daily. 90 tablet 3    predniSONE (Deltasone) 10 mg tablet Take 1 tablet (10 mg) by mouth once daily. 90 tablet 1    Skyrizi 360 mg/2.4 mL (150 mg/mL) wearable injector injection ADMINISTER 360 MG UNDER THE SKIN VIA ON BODY INJECTOR EVERY 8 WEEKS 1 mL 4    valsartan (Diovan) 160 mg tablet Take 1 tablet (160 mg) by mouth once daily. 90 tablet 1     No current facility-administered medications on file prior to visit.        Review of Systems   All other systems reviewed and are negative.      Objective   Physical Exam  Vitals reviewed.   Constitutional:       General: He is awake. He is not in acute distress.     Appearance: Normal appearance. He is obese. He is not ill-appearing, toxic-appearing or diaphoretic.   HENT:      Head: Normocephalic and atraumatic.   Eyes:      General: No scleral icterus.     Pupils: Pupils are equal, round, and reactive to light.   Cardiovascular:      Rate and Rhythm: Normal rate and regular rhythm.      Heart sounds: Normal heart sounds. No murmur heard.  Pulmonary:      Effort:  "Pulmonary effort is normal. No respiratory distress.      Breath sounds: Normal breath sounds.   Abdominal:      General: Abdomen is protuberant. Bowel sounds are normal.      Palpations: Abdomen is soft. There is no hepatomegaly.      Tenderness: There is no abdominal tenderness. There is no guarding or rebound.   Musculoskeletal:         General: Normal range of motion.      Cervical back: Normal range of motion.      Right lower leg: No edema.      Left lower leg: No edema.   Skin:     General: Skin is warm and dry.      Coloration: Skin is not jaundiced or pale.   Neurological:      General: No focal deficit present.      Mental Status: He is alert and oriented to person, place, and time.      Motor: No weakness.      Gait: Gait normal.   Psychiatric:         Mood and Affect: Mood normal.         Behavior: Behavior normal. Behavior is cooperative.         Thought Content: Thought content normal.         Judgment: Judgment normal.       /80   Pulse 64   Temp 36.2 °C (97.1 °F)   Resp 18   Ht 1.803 m (5' 11\")   Wt 127 kg (280 lb)   SpO2 95%   BMI 39.05 kg/m²      Lab Results   Component Value Date    WBC 8.3 11/06/2024    HGB 14.5 11/06/2024    HCT 43.6 11/06/2024    MCV 88 11/06/2024     11/06/2024           No lab exists for component: \"LABALBU\"    No results found for: \"AFP\"  Lab Results   Component Value Date    TSH 1.51 11/14/2024         Assessment/Plan   Diagnoses and all orders for this visit:  Crohn's disease of colon without complication (Multi)  -     CBC; Future  -     Comprehensive metabolic panel; Future  -     Sedimentation Rate; Future  -     C-reactive protein; Future  59-year-old male diagnosed with Crohn's disease in 2011.  Status post sigmoid resection.  Previously failed Humira, Remicade and Stelara.  He was started on Skyrizi in May 2024 and has been doing well.  He underwent a colonoscopy in December, 2024 with an SES CD score of 3.,  Improved from his last colonoscopy " which was 17.  He was complaining of joint pain, has followed up with rheumatology and it appears that his pain is from OA.  Patient continues to smoke cigarettes.  No EIM's at this time.  Patient with clinical history suggestive of constipation, no red flag symptoms noted.  Will have him trial a bowel regimen with MiraLAX, Metamucil, adequate fluids and increased exercise.    -Will need annual skin checks  -Complete smoking cessation  -Keep follow-up with rheumatology (he was started on low-dose prednisone)  -Annual PCP visits along with recommended vaccines  -Continue Skyrizi  -Obtain labs prior to follow-up  -See Dr. Muniz in 6 months, sooner if needed.       Jamsin Saba CNP

## 2025-02-03 NOTE — PATIENT INSTRUCTIONS
Daily bowel regimen:  -MiraLAX 1 tsp  daily in 8 ounces of any liquid.  You can increase or decrease the dose as needed the goal is to have a daily BM and feel fully evacuated.  -Metamucil 1 teaspoon daily in 8 ounces of water  -Footstool or squatty potty during bowel movements  -Adequate water throughout the day, 64 oz   -Walk 15 to 30 minutes daily to help promote GI motility

## 2025-03-03 ENCOUNTER — OFFICE VISIT (OUTPATIENT)
Dept: PRIMARY CARE | Facility: CLINIC | Age: 60
End: 2025-03-03
Payer: COMMERCIAL

## 2025-03-03 VITALS
SYSTOLIC BLOOD PRESSURE: 130 MMHG | WEIGHT: 284 LBS | HEART RATE: 91 BPM | HEIGHT: 71 IN | BODY MASS INDEX: 39.76 KG/M2 | DIASTOLIC BLOOD PRESSURE: 82 MMHG

## 2025-03-03 DIAGNOSIS — J10.1 INFLUENZA A: ICD-10-CM

## 2025-03-03 DIAGNOSIS — B96.89 BACTERIAL SINUSITIS: ICD-10-CM

## 2025-03-03 DIAGNOSIS — J32.9 BACTERIAL SINUSITIS: Primary | ICD-10-CM

## 2025-03-03 DIAGNOSIS — B96.89 BACTERIAL SINUSITIS: Primary | ICD-10-CM

## 2025-03-03 DIAGNOSIS — J32.9 BACTERIAL SINUSITIS: ICD-10-CM

## 2025-03-03 LAB
POC RAPID INFLUENZA A: POSITIVE
POC RAPID INFLUENZA B: NEGATIVE

## 2025-03-03 PROCEDURE — 3079F DIAST BP 80-89 MM HG: CPT | Performed by: FAMILY MEDICINE

## 2025-03-03 PROCEDURE — 99213 OFFICE O/P EST LOW 20 MIN: CPT | Performed by: FAMILY MEDICINE

## 2025-03-03 PROCEDURE — 3008F BODY MASS INDEX DOCD: CPT | Performed by: FAMILY MEDICINE

## 2025-03-03 PROCEDURE — 3075F SYST BP GE 130 - 139MM HG: CPT | Performed by: FAMILY MEDICINE

## 2025-03-03 PROCEDURE — 87804 INFLUENZA ASSAY W/OPTIC: CPT | Performed by: FAMILY MEDICINE

## 2025-03-03 PROCEDURE — 1036F TOBACCO NON-USER: CPT | Performed by: FAMILY MEDICINE

## 2025-03-03 RX ORDER — AZITHROMYCIN 250 MG/1
1000 TABLET, FILM COATED ORAL ONCE
Qty: 4 TABLET | Refills: 0 | Status: SHIPPED | OUTPATIENT
Start: 2025-03-03 | End: 2025-03-03 | Stop reason: SDUPTHER

## 2025-03-03 RX ORDER — AZITHROMYCIN 250 MG/1
1000 TABLET, FILM COATED ORAL ONCE
Qty: 4 TABLET | Refills: 0 | Status: SHIPPED | OUTPATIENT
Start: 2025-03-03 | End: 2025-03-03

## 2025-03-03 ASSESSMENT — ENCOUNTER SYMPTOMS
RHINORRHEA: 1
COUGH: 1
CHILLS: 0
DIZZINESS: 0
FATIGUE: 0
SINUS PAIN: 1
SHORTNESS OF BREATH: 1
SINUS PRESSURE: 1
CHEST TIGHTNESS: 0
HEADACHES: 0

## 2025-03-03 NOTE — PROGRESS NOTES
"Subjective   Patient ID: Colby Cordero III is a 59 y.o. male who presents for Sick Visit (Cough sinus fever off and on since last thursday).    Influenza   - reports he has been sick for the last 4-5 days   - did have a positive flu test in office today   - reports this morning he has been having significant sinus pressure, sinus pain and congestion   - endorses bad cough, fevers/chills, body aches, and some shortness of breath   - does use a cpap machine at night, and has had problems with it due to the significant sinus congestion   - has tried taking OTC tylenol without significant improvement   - took nyquil which did not help him through the night        Review of Systems   Constitutional:  Negative for chills and fatigue.   HENT:  Positive for rhinorrhea, sinus pressure and sinus pain.    Respiratory:  Positive for cough and shortness of breath. Negative for chest tightness.    Neurological:  Negative for dizziness and headaches.       Objective   /82   Pulse 91   Ht 1.803 m (5' 11\")   Wt 129 kg (284 lb)   BMI 39.61 kg/m²     Physical Exam  Constitutional:       General: He is not in acute distress.     Appearance: Normal appearance.   HENT:      Mouth/Throat:      Mouth: Mucous membranes are moist.      Pharynx: Oropharynx is clear.   Cardiovascular:      Rate and Rhythm: Normal rate and regular rhythm.   Pulmonary:      Effort: No respiratory distress.      Breath sounds: No stridor. No wheezing.   Neurological:      Mental Status: He is alert.   Psychiatric:         Mood and Affect: Mood normal.         Behavior: Behavior normal.       Assessment/Plan   Problem List Items Addressed This Visit    None  Visit Diagnoses         Codes    Acute cough     R05.1               "

## 2025-03-04 ENCOUNTER — TELEPHONE (OUTPATIENT)
Dept: PRIMARY CARE | Facility: CLINIC | Age: 60
End: 2025-03-04
Payer: COMMERCIAL

## 2025-03-04 DIAGNOSIS — J10.1 INFLUENZA A: Primary | ICD-10-CM

## 2025-03-04 RX ORDER — AZITHROMYCIN 250 MG/1
TABLET, FILM COATED ORAL
Qty: 6 TABLET | Refills: 0 | Status: SHIPPED | OUTPATIENT
Start: 2025-03-04 | End: 2025-03-09

## 2025-04-17 ENCOUNTER — PATIENT MESSAGE (OUTPATIENT)
Dept: GASTROENTEROLOGY | Facility: CLINIC | Age: 60
End: 2025-04-17
Payer: COMMERCIAL

## 2025-04-17 ENCOUNTER — TELEPHONE (OUTPATIENT)
Dept: GASTROENTEROLOGY | Facility: CLINIC | Age: 60
End: 2025-04-17
Payer: COMMERCIAL

## 2025-04-17 NOTE — TELEPHONE ENCOUNTER
Received patient assistance application via fax from [employer] for Cindy. Left VM for patient to confirm he would like to initiate . Asked for call back.

## 2025-04-23 PROBLEM — L98.9 SKIN LESION OF BACK: Status: ACTIVE | Noted: 2024-02-19

## 2025-04-23 ASSESSMENT — ENCOUNTER SYMPTOMS
LIGHT-HEADEDNESS: 0
FEVER: 0
AGITATION: 0
CONFUSION: 0
COUGH: 0
SORE THROAT: 0
NAUSEA: 0
DYSURIA: 0
CHILLS: 0
ABDOMINAL PAIN: 0
VOMITING: 0
DIZZINESS: 0
BLOOD IN STOOL: 0
DIARRHEA: 0
CONSTIPATION: 0
SHORTNESS OF BREATH: 0

## 2025-05-03 DIAGNOSIS — K50.10 CROHN'S DISEASE OF COLON WITHOUT COMPLICATION (MULTI): ICD-10-CM

## 2025-05-10 NOTE — PROGRESS NOTES
Subjective   Patient ID: [id number]   Colby Cordero III is a 59 y.o. male with Crohn's with IA, HTN, HLD, and BMI 37, presents for follow up on IA     Current IS:  - Cindy, 4/24, did the infusion and now on the shots, every 2 months   - Prednisone 10 mg daily    Prior IS:  - Stelara started in 9/2017 to 4/24, stopped working   - Humira 2013- held in 2014 as was noted to have + quantiferon gold  - Entivyo (Vedolizuman) June 2015 due to recurring GI sx   - SSZ added in 5/2016, no significant improvement, dc  - Remicade 9/16 -> 9/2017, worsening joint pains  - Leflunomide [id number], did not help  - MTX [id number], did not help  - Budesonide  - Prednisone  - Mesalamine      HPI  Saw PCP  Saw gen surgery -> Recurrent infected back sebaceous cyst. He will undergo excision in the office  Saw GI, Colonoscopy showed improvement of Crohn's  Flu in March  Doing exercises for the back and whole body, virtual with [employer], hinge health   Doing better with the Skbud[employer]  Some mechanical back pain  Some swelling intermittently in his hands  No MS   His fecal calprotectin was the lowest in years  Saw podiatry for R ankle MRI, OA  Doesn't want to change Mingi, working well for his Crohn's so far   He reports most medications did not help his joints  Only Prednisone works   As the day progresses the pain gets worse  Plantar fasciitis, did release surgery with podiatry  Now has lateral band plantar fasciitis -> injection     Colonoscopy 4/24:  External hemorrhoids  Multiple medium, scattered pancolonic diverticula of moderate severity  Abnormal mucosa, consistent with Crohn's disease. There was patchy inflammation in the sigmoid and descending colon. Normal appearing mucosa between inflammation    Compliant with meds  No side effects reported  No dactylitis  No IBP   No buttocks pain  No episodes of eye inflammation  No sicca sx  No chest pain, cough or dyspnea  No rashes  No infections  No psoriasis     ROS:  As per HPI     Rheum hx  (Recall from Dr. Freeman's notes):  Initially saw Dr. Khanna for many years at . Then switched care to and saw Dr. Bell at [facility]. He is now transferring care back to , presents to establish care as well as address PCP concerns as noted on referral.      Crohns: diagnosed (mass in colon, removed), recurrent- segmental crohns in L colon ( at least since [id number]- initially presented with bowel changes and found to have mass in 2011 (circumferential mass like appears- underwent resection and repeat colonoscopy in Jan 2012 unremarkable. Then, Jan 2013- recurrent GI symptoms, repeat colonoscopy, friable edematous inflammatory changes and dx with segmental Crohns disease.      Pt states for many years now he had been experiencing pain/stiffness in legs, feet, hips, shoulder and arms, and fatigue. About 3-4 weeks ago- he saw his PCP and mentioned above complaints including muscle pain, stiffness, and joint pain. He was recommended to stop Atorvastatin and see if related to it (had been on Atorvastatin for many years). Since stopping he feels about 85-90% better in terms of above sx.   He currently is having issues with plantar fascitis R foot, previous hx on L and needed a procedure  Denies any other current joint pain or swelling  No dactylitis  No significant swelling  No active stiffness currently  Mild soreness in hands, tennis elbow once in while  Intermittent knee pain      Denies fever, unintentional weight loss  Denies hx of uveitis, gets annual eye exam, no hx of psoriasis. No hx of blood clot.     ID HX: Saw Dr. Townsend in 2015 for Latent TB: TB test negative in July 2013, while on Humira: routine + PPD on 12/2014, + quant TB, CXR nml.. Treated with Isoniazid and pyridoxine for latent TB. Hep serology previously negative 2013, 2019 Hep C Ab NR.      FMHX of autoimmune disease: None   PSH: CTS on R, plantar fascitis on L, colon surgery  SocHX: Current vape, rare ETOH use    Patient Active Problem List    Diagnosis    Abdominal wall abscess    Abdominal bloating    Chronic mastoiditis of both sides    Bilateral chronic otitis media    Chronic suppurative otitis media of right ear    Conductive hearing loss, bilateral    Hearing loss of right ear due to cerumen impaction    Mixed conductive and sensorineural hearing loss of both ears    Mixed conductive and sensorineural hearing loss, unilateral, left ear with restricted hearing on the contralateral side    Sensorineural hearing loss (SNHL) of right ear with restricted hearing of left ear    Crohn's disease of colon    Cuboid syndrome    Current every day smoker    Gout    Enteropathic arthritis associated with Crohn's disease    Fatigue    Hyperglycemia    Hyperlipidemia    Hypertension    Inguinal hernia    Irritable bowel syndrome    Latent tuberculosis infection    Mass of shoulder region    Mechanical pain of right foot    Nevus, atypical    Osteoarthritis of foot joint    Perforation of both tympanic membranes due to otitis media    Myalgia    Obesity    Nontraumatic perforation of tympanic membrane    Peripheral neuropathy    Peroneal tendinitis of left lower extremity    Plantar fascial fibromatosis of left foot    Plantar fasciitis, left    Positive BRANNON (antinuclear antibody)    Sensation of fullness in both ears    Sensorineural hearing loss (SNHL) of left ear    Skin tag, acquired    VALENTINO (obstructive sleep apnea)    Snoring    Stage 3a chronic kidney disease (Multi)    Tinnitus, subjective    Tympanic membrane perforation, left    Vitamin D deficiency    Vitamin B12 deficiency    Plantar fasciitis of right foot    Lateral epicondylitis    PPD positive, treated    Carpal tunnel syndrome on right    Arthralgia    Cigarette nicotine dependence without complication    Prostate cancer screening    Dyslipidemia    Enteropathic arthritis    Hemorrhoids    History of hypertension    Skin lesion of back    Health maintenance examination    Sebaceous cyst    Visit  for wound check     Past Medical History:   Diagnosis Date    Chronic otitis media 10/09/2023    CKD (chronic kidney disease)     stage 3    Crohn's disease, unspecified, without complications (Multi) 08/24/2020    Crohn's disease    Hemorrhage of anus and rectum     Hemorrhage of rectum and anus    Hemorrhage of rectum and anus 01/23/2024    Hyperlipidemia, unspecified     Dyslipidemia    Hypertension     Low back pain 02/19/2024    Otitis media, unspecified, right ear 01/04/2018    Acute otitis media, right    Pain of foot 02/19/2024    Pain of plantar aspect of heel 02/19/2024    Personal history of other diseases of the circulatory system     History of hypertension    Personal history of other diseases of the nervous system and sense organs     History of sleep apnea    Personal history of other diseases of the respiratory system 12/08/2018    History of upper respiratory infection    Personal history of other diseases of the respiratory system     History of bronchitis    Rash 02/19/2024    Comment on above: PERINEAL AREA;    Rash and other nonspecific skin eruption     Rash    Rupture of Achilles tendon 02/19/2024    Sleep apnea     Strain of lumbar region 02/19/2024    Unspecified abdominal pain 08/24/2020    Abdominal cramping    Upper respiratory tract infection 02/19/2024     Past Surgical History:   Procedure Laterality Date    COLONOSCOPY  08/24/2020    Complete Colonoscopy    HERNIA REPAIR  08/24/2020    Hernia Repair    OTHER SURGICAL HISTORY  08/24/2020    Carpal tunnel surgery    OTHER SURGICAL HISTORY  08/24/2020    Tonsillectomy    OTHER SURGICAL HISTORY  08/24/2020    Adenoidectomy    OTHER SURGICAL HISTORY  08/25/2022    Colonoscopy    OTHER SURGICAL HISTORY  08/25/2022    Hernia repair    PLANTAR FASCIA SURGERY Bilateral     SMALL INTESTINE SURGERY  08/24/2020    Small Bowel Resection    SOFT TISSUE CYST EXCISION N/A      Social History     Socioeconomic History    Marital status:       Spouse name: Not on file    Number of children: Not on file    Years of education: Not on file    Highest education level: Not on file   Occupational History    Not on file   Tobacco Use    Smoking status: Former     Current packs/day: 0.00     Average packs/day: 1 pack/day for 40.0 years (40.0 ttl pk-yrs)     Types: Cigarettes     Start date:      Quit date:      Years since quittin.3    Smokeless tobacco: Never    Tobacco comments:     Vape - everyday    Vaping Use    Vaping status: Every Day    Substances: Nicotine   Substance and Sexual Activity    Alcohol use: Yes     Alcohol/week: 1.0 standard drink of alcohol     Types: 1 Standard drinks or equivalent per week     Comment: maybe 1  a month    Drug use: Never    Sexual activity: Defer     Partners: Female   Other Topics Concern    Not on file   Social History Narrative    Not on file     Social Drivers of Health     Financial Resource Strain: Not on file   Food Insecurity: Not on file   Transportation Needs: Not on file   Physical Activity: Not on file   Stress: Not on file   Social Connections: Not on file   Intimate Partner Violence: Not on file   Housing Stability: Not on file     Allergies   Allergen Reactions    Cephalexin Hives    Atorvastatin Myalgia     Joint pain and fatigue     Current Outpatient Medications:     amLODIPine (Norvasc) 10 mg tablet, Take 1 tablet (10 mg) by mouth once daily. (Patient not taking: Reported on 2025), Disp: 90 tablet, Rfl: 3    cholecalciferol (Vitamin D3) 5,000 Units tablet, Take 1 tablet (5,000 Units) by mouth once daily., Disp: , Rfl:     cyanocobalamin, vitamin B-12, (Vitamin B-12) 1,000 mcg tablet extended release, Take 1 tablet (1,000 mcg) by mouth once daily., Disp: , Rfl:     DULoxetine (Cymbalta) 30 mg DR capsule, Take 1 capsule (30 mg) by mouth once daily. Do not crush or chew., Disp: 90 capsule, Rfl: 3    metoprolol succinate XL (Toprol-XL) 100 mg 24 hr tablet, Take 1 tablet (100 mg) by mouth  once daily., Disp: 90 tablet, Rfl: 3    predniSONE (Deltasone) 1 mg tablet, Take 4 tablets (4 mg) by mouth once daily., Disp: 360 tablet, Rfl: 1    predniSONE (Deltasone) 5 mg tablet, Take 1 tablet (5 mg) by mouth once daily. Combine the 5 mg tablets with the 1 mg tablets to get: 9 mg daily for 4 weeks then 8 mg daily for 4 weeks then  7 mg daily until follow up, Disp: 90 tablet, Rfl: 1    Skyrizi 360 mg/2.4 mL (150 mg/mL) wearable injector injection, ADMINISTER 360 MG UNDER THE SKIN VIA ON BODY INJECTOR EVERY 8 WEEKS, Disp: 1 mL, Rfl: 4    valsartan (Diovan) 160 mg tablet, Take 1 tablet (160 mg) by mouth once daily., Disp: 90 tablet, Rfl: 1     Objective   Visit Vitals  /81   Pulse 80   Temp 35.7 °C (96.3 °F)   Resp 20     Physical Exam:  General: AAOx3, Cooperative  Skin: No rashes, ulcers or photosensitive areas  MSK: Upper Extremities:  Hand/Fingers: No erythema, edema, tenderness or warmth at DIP, PIP, or MCP joints, FROM grossly. Good hand . No nodules. No deformities   Wrists: No erythema, edema, warmth or tenderness at wrist, FROM grossly  Elbows: No tenderness, edema, erythema or warmth at elbows, FROM grossly. No nodules   Shoulders: No edema, erythema, tenderness or warmth at shoulders. FROM  Lower Extremities:   Hips: No obvious deformities. No joint tenderness, normal ROM grossly. No trochanteric bursae TTP  Knees: No tenderness, deformities, edema, rashes, or warmth, normal ROM grossly. No crepitus, no pes anserine bursa TTP   Ankles, feet: No deformities, tenderness, edema, erythema, ulceration, or warmth at the ankle or MTP/IP joints, normal ROM grossly  Spine: No spinal tenderness to palpation. No SI joint tenderness     Lab Results   Component Value Date    WBC 8.2 05/14/2025    HGB 15.7 05/14/2025    HCT 47.2 05/14/2025    MCV 91.7 05/14/2025     05/14/2025        Chemistry    Lab Results   Component Value Date/Time     05/14/2025 1222    K 3.9 05/14/2025 1222      05/14/2025 1222    CO2 25 05/14/2025 1222    BUN 13 05/14/2025 1222    CREATININE 1.60 (H) 05/14/2025 1222    Lab Results   Component Value Date/Time    CALCIUM 9.7 05/14/2025 1222    ALKPHOS 65 05/14/2025 1222    AST 14 05/14/2025 1222    ALT 18 05/14/2025 1222    BILITOT 0.6 05/14/2025 1222        Lab Results   Component Value Date    CRP 5.9 05/14/2025      Lab Results   Component Value Date    SEDRATE 14 05/14/2025      Lab Results   Component Value Date    HEPCAB NON-REACTIVE 04/17/2019      Lab Results   Component Value Date    ALT 18 05/14/2025    AST 14 05/14/2025    ALKPHOS 65 05/14/2025    BILITOT 0.6 05/14/2025     Colonoscopy Diagnostic  Table formatting from the original result was not included.  Impression  Scattered diverticulosis of moderate severity  Hemorrhoids  Healthy end-to-end colocolonic anastomosis in the rectosigmoid  Abnormal mucosa, consistent with Crohn's disease  Single small angioectasia in the ascending colon    Findings  Multiple medium, scattered pancolonic diverticula of moderate severity  External hemorrhoids observed during retroflexion  Healthy end-to-end colocolonic anastomosis in the rectosigmoid  Abnormal mucosa, consistent with Crohn's disease. There were three areas   of inflammation in the  descending colon. The surrounding mucosa appeared   normal; SES-CD scores: rectum 0, sigmoid/descending colon 3, transverse   colon 0, ascending colon 0, ileum 0, total score 3  Single small angioectasia in the ascending colon; no bleeding was observed       Recommendation  Await pathology results   Follow up with me in clinic, due: 1/3/2025   Continue Skyrizi  Continue prednisone - taper per Rheumatology       Indication  Crohn's disease of colon with complication (Multi)    Staff  Staff Role   Judy MONDRAGON MD Proceduralist     Medications  See Anesthesia Record.     Preprocedure  A history and physical has been performed, and patient medication   allergies have been reviewed. The  patient's tolerance of previous   anesthesia has been reviewed. The risks and benefits of the procedure and   the sedation options and risks were discussed with the patient. All   questions were answered and informed consent obtained.    Details of the Procedure  The patient underwent monitored anesthesia care, which was administered by   an anesthesia professional. The patient's blood pressure, ECG, ETCO2,   heart rate, level of consciousness, oxygen and respirations were monitored   throughout the procedure. A digital rectal exam was performed. The scope   was introduced through the anus and advanced to the terminal ileum.   Retroflexion was performed in the rectum. The quality of bowel preparation   was evaluated using the [place] Bowel Preparation Scale with scores of:   right colon = 3, transverse colon = 3, left colon = 3. The total BBPS   score was 9. Bowel prep was adequate. The patient's estimated blood loss   was minimal (<5 mL). The procedure was not difficult. The patient   tolerated the procedure well. There were no apparent adverse events.     Events  Procedure Events   Event Event Time   ENDO SCOPE IN TIME 12/4/2024  9:41 AM   ENDO CECUM REACHED 12/4/2024  9:44 AM     Specimens  ID Type Source Tests Collected by Time   1 : bx no active disease Tissue COLON - CECUM BIOPSY SURGICAL PATHOLOGY   EXAM Lani Dockery RN 12/4/2024 0946   2 : bx no active disease Tissue COLON - ASCENDING BIOPSY SURGICAL   PATHOLOGY EXAM Lani Dockery RN 12/4/2024 0947   3 : bx no active disease Tissue COLON - TRANSVERSE BIOPSY SURGICAL   PATHOLOGY EXAM Lani Dockery RN 12/4/2024 0948   4 : bx inflammation Tissue COLON - DESCENDING BIOPSY SURGICAL PATHOLOGY   EXAM Lani Dockery RN 12/4/2024 0949   5 : bx descending  anastomosis inflammation Tissue COLON - DESCENDING   BIOPSY SURGICAL PATHOLOGY EXAM Lani Dockery RN 12/4/2024 0956   6 : bx no active disease Tissue COLON - DESCENDING BIOPSY SURGICAL   PATHOLOGY  EXAM Lani Dockery RN 12/4/2024 0957   A :  Tissue RECTUM BIOPSY  Lani Dockery RN 12/4/2024 0958     Procedure Location  [facility]  [facility]  [id number] [facility] [id number]    Referring Provider  Judy Muniz MD    Procedure Provider  Judy MONDRAGON MD     === 06/09/23 ===  FOOT  1 cm focus of calcification in the distal Achilles. This suggests chronic tendinosis or remote injury.  Advanced right 1st metatarsophalangeal osteoarthritis    CT chest:  1.  Few small bilateral noncalcified pulmonary nodules measuring up to 3 mm, likely benign. Continued screening with low-dose noncontrast chest CT in 12 months (from current date) is recommended.  2. Mild apical emphysema.  3. Mild coronary artery calcifications.      Assessment/Plan    This is a 59 Y M with Crohn's colitis and IA, statin induced myalgia, presenting for follow up  Last seen in 10/24     Pt with long standing hx of Crohns (currently on Stelara with good control of GI sx), also possible component of IBD related arthritis (joint sx stable), was noted to have recurring myalgias, joint pain without swelling, fatigue, which improved since stopping Atorvastatin (likely statin induced myalgia, normal CK, doubt HMGCR myopathy as sx improved since stopping), no cutaneous manifestations to suggest DM and intact mm strength (doubt PM). Repeat CK nml, aldolase nml, AST/ALT nml. Low titer positive BRANNON and dsDNA ,the most likely explanation is prior TNFi use (Humira and Remicade)- which can cause + BRANNON/dsDNA. No current sx to suggest DILE/SLE. No photosensitive rash, Raynauds, hx of effusions. Nml C3/C4, histone antibody negative, no cytopenia, nml CK. Workup was recently completed by PCP 6/1/23. ESR/CRP nml. CMP shows mild renal insufficiency (cr 1.4 7,GFR > 55, since 4/2019, of note, no proteinuria or RBC). His current sx are more mechanical, no inflammatory component. He did respond to steroids, but all the  previous csDMARDs as well as the biologics (all of them) did not help his joint pains at all. Discussion done with the patient about his condition      Labs:  5/25: Cr 1.6, rest of CMP, CBC, ESR, CRP, PSA, Ualb/cr wnl  Colonic biopsy: Some active chronic colitis   11/24: TSH, vit D, B12, PTH, UA wnl  11/24: Cr 1.5, rest of CMP, CBC, ESR, CRP wnl  8/24: Cr 1.56, rest of CMP, CBC, ESR, CRP wnl. Calprotectin 207  4/24: Colonic biopsy -> chronic active colitis   1/24: Cr 1.61, CRP 1.2, calprotectin 730, rest of CMP, CBC, PSA, ESR wnl  11/23: Cr 1.5  7/28/23 dsDNA 18  6/2023 BRANNON 1:160, dsDNA 24, rest ANGELICA negative. C3/C4 nml. Nml CBC, CK, ESR/CRP, histone Ab, UA + glucose. SPEP no monoclonal protein  11/2018 BRANNON 1:160, dsDNA 13    Imaging:  MRI R ankle:   1.  Mild thickening and increased signal in the proximal plantar fascia central band. Some of this may represent postsurgical changes with plantar fasciitis also considered. Correlate clinically.   2.  Mild tenosynovitis involving the peroneus brevis and longus tendons. Also question of focal tenosynovitis involving the tibialis posterior and flexor digitorum longus tendons.     Xray right foot: Fairly advanced 1st metatarsophalangeal osteoarthritis unchanged. Large focus of Achilles calcification and midfoot osteoarthritis also unchanged    Xray hands, feet and knees 9/22: OA      # Crohns colitis, on Skyrizi per GI  # IBD related arthritis is questionable, likely OA ongoing since initial dx, currently no synovitis, no improvement with DMARDs except predisone  # Myalgia, resolved, likely related to statin use, resolved since stopping. Recommend treatment for HLD with non- statin agents. Nml CK/aldolase, doubt HMGCR myopathy  # Low titer BRANNON, dsDNA, hx of TNFi usse (Humira [id number], Remicade [id number], first + BRANNON 2018). No current sx/findings to suggest DILE/SLE  # CKD stage III, stable, no proteinuria/RBC  # Vitamin D def  # Diffuse OA      - Continue Skyrizi per  GI  - Can't add any csDMARDs as he reports no improvement with them  - Taper prednisone by 1 mg every 4 weeks, currently on 10 mg, reports worsening of his pain with the 5 mg dose    - I do not prescribe long term pain meds, pt declined pain med referral, will see his PCP for Cymbalta   - Unable to use chronic NSAIDs as CKD  - Tylenol upto 2gm as needed  - Discussed sx of photosensitive rash, Raynaud's, worsening renal function/cytopenia, will monitor for SLE manifestations but low clinical/serological concerns      RTC in 3 months    Plan, including risks and benefits, was discussed with the patient, informed on how to reach us.     To schedule an appointment, call [phone]  To reach the rheumatology office, call [phone]    Tiffani Mojica MD      Division of Rheumatology  [facility]

## 2025-05-15 LAB
ALBUMIN SERPL-MCNC: 4.3 G/DL (ref 3.6–5.1)
ALBUMIN/CREAT UR: 3 MG/G CREAT
ALP SERPL-CCNC: 65 U/L (ref 35–144)
ALT SERPL-CCNC: 18 U/L (ref 9–46)
ANION GAP SERPL CALCULATED.4IONS-SCNC: 11 MMOL/L (CALC) (ref 7–17)
AST SERPL-CCNC: 14 U/L (ref 10–35)
BASOPHILS # BLD AUTO: 57 CELLS/UL (ref 0–200)
BASOPHILS NFR BLD AUTO: 0.7 %
BILIRUB SERPL-MCNC: 0.6 MG/DL (ref 0.2–1.2)
BUN SERPL-MCNC: 13 MG/DL (ref 7–25)
CALCIUM SERPL-MCNC: 9.7 MG/DL (ref 8.6–10.3)
CHLORIDE SERPL-SCNC: 103 MMOL/L (ref 98–110)
CO2 SERPL-SCNC: 25 MMOL/L (ref 20–32)
CREAT SERPL-MCNC: 1.6 MG/DL (ref 0.7–1.35)
CREAT UR-MCNC: 117 MG/DL (ref 20–320)
CRP SERPL-MCNC: 5.9 MG/L
EGFRCR SERPLBLD CKD-EPI 2021: 49 ML/MIN/1.73M2
EOSINOPHIL # BLD AUTO: 221 CELLS/UL (ref 15–500)
EOSINOPHIL NFR BLD AUTO: 2.7 %
ERYTHROCYTE [DISTWIDTH] IN BLOOD BY AUTOMATED COUNT: 14.1 % (ref 11–15)
ERYTHROCYTE [SEDIMENTATION RATE] IN BLOOD BY WESTERGREN METHOD: 14 MM/H
GLUCOSE SERPL-MCNC: 101 MG/DL (ref 65–99)
HCT VFR BLD AUTO: 47.2 % (ref 38.5–50)
HGB BLD-MCNC: 15.7 G/DL (ref 13.2–17.1)
LYMPHOCYTES # BLD AUTO: 2189 CELLS/UL (ref 850–3900)
LYMPHOCYTES NFR BLD AUTO: 26.7 %
MCH RBC QN AUTO: 30.5 PG (ref 27–33)
MCHC RBC AUTO-ENTMCNC: 33.3 G/DL (ref 32–36)
MCV RBC AUTO: 91.7 FL (ref 80–100)
MICROALBUMIN UR-MCNC: 0.4 MG/DL
MONOCYTES # BLD AUTO: 631 CELLS/UL (ref 200–950)
MONOCYTES NFR BLD AUTO: 7.7 %
NEUTROPHILS # BLD AUTO: 5100 CELLS/UL (ref 1500–7800)
NEUTROPHILS NFR BLD AUTO: 62.2 %
PLATELET # BLD AUTO: 209 THOUSAND/UL (ref 140–400)
PMV BLD REES-ECKER: 9.7 FL (ref 7.5–12.5)
POTASSIUM SERPL-SCNC: 3.9 MMOL/L (ref 3.5–5.3)
PROT SERPL-MCNC: 6.8 G/DL (ref 6.1–8.1)
PSA SERPL-MCNC: 1.38 NG/ML
RBC # BLD AUTO: 5.15 MILLION/UL (ref 4.2–5.8)
SODIUM SERPL-SCNC: 139 MMOL/L (ref 135–146)
WBC # BLD AUTO: 8.2 THOUSAND/UL (ref 3.8–10.8)

## 2025-05-19 ENCOUNTER — APPOINTMENT (OUTPATIENT)
Dept: RHEUMATOLOGY | Facility: CLINIC | Age: 60
End: 2025-05-19
Payer: COMMERCIAL

## 2025-05-19 VITALS
WEIGHT: 285 LBS | SYSTOLIC BLOOD PRESSURE: 136 MMHG | RESPIRATION RATE: 20 BRPM | HEART RATE: 80 BPM | TEMPERATURE: 96.3 F | DIASTOLIC BLOOD PRESSURE: 81 MMHG | BODY MASS INDEX: 39.75 KG/M2

## 2025-05-19 DIAGNOSIS — Z79.899 ENCOUNTER FOR LONG-TERM (CURRENT) USE OF HIGH-RISK MEDICATION: ICD-10-CM

## 2025-05-19 DIAGNOSIS — M19.071 PRIMARY OSTEOARTHRITIS OF BOTH FEET: ICD-10-CM

## 2025-05-19 DIAGNOSIS — N18.31 STAGE 3A CHRONIC KIDNEY DISEASE (MULTI): ICD-10-CM

## 2025-05-19 DIAGNOSIS — M19.042 PRIMARY OSTEOARTHRITIS OF BOTH HANDS: ICD-10-CM

## 2025-05-19 DIAGNOSIS — M07.60 INFLAMMATORY BOWEL ARTHRITIS: ICD-10-CM

## 2025-05-19 DIAGNOSIS — K50.10 CROHN'S DISEASE OF COLON WITHOUT COMPLICATION (MULTI): Primary | ICD-10-CM

## 2025-05-19 DIAGNOSIS — K63.9 INFLAMMATORY BOWEL ARTHRITIS: ICD-10-CM

## 2025-05-19 DIAGNOSIS — M19.072 PRIMARY OSTEOARTHRITIS OF BOTH FEET: ICD-10-CM

## 2025-05-19 DIAGNOSIS — M17.0 PRIMARY OSTEOARTHRITIS OF BOTH KNEES: ICD-10-CM

## 2025-05-19 DIAGNOSIS — M19.041 PRIMARY OSTEOARTHRITIS OF BOTH HANDS: ICD-10-CM

## 2025-05-19 DIAGNOSIS — E55.9 VITAMIN D DEFICIENCY: ICD-10-CM

## 2025-05-19 PROCEDURE — 99214 OFFICE O/P EST MOD 30 MIN: CPT | Performed by: STUDENT IN AN ORGANIZED HEALTH CARE EDUCATION/TRAINING PROGRAM

## 2025-05-19 PROCEDURE — 3075F SYST BP GE 130 - 139MM HG: CPT | Performed by: STUDENT IN AN ORGANIZED HEALTH CARE EDUCATION/TRAINING PROGRAM

## 2025-05-19 PROCEDURE — 3079F DIAST BP 80-89 MM HG: CPT | Performed by: STUDENT IN AN ORGANIZED HEALTH CARE EDUCATION/TRAINING PROGRAM

## 2025-05-19 RX ORDER — PREDNISONE 5 MG/1
5 TABLET ORAL DAILY
Qty: 90 TABLET | Refills: 1 | Status: SHIPPED | OUTPATIENT
Start: 2025-05-19 | End: 2025-11-15

## 2025-05-19 RX ORDER — PREDNISONE 1 MG/1
4 TABLET ORAL DAILY
Qty: 360 TABLET | Refills: 1 | Status: SHIPPED | OUTPATIENT
Start: 2025-05-19 | End: 2025-11-15

## 2025-08-18 ENCOUNTER — APPOINTMENT (OUTPATIENT)
Dept: RHEUMATOLOGY | Facility: CLINIC | Age: 60
End: 2025-08-18
Payer: COMMERCIAL

## 2025-08-19 DIAGNOSIS — E55.9 VITAMIN D DEFICIENCY: ICD-10-CM

## 2025-08-19 DIAGNOSIS — K63.9 INFLAMMATORY BOWEL ARTHRITIS: ICD-10-CM

## 2025-08-19 DIAGNOSIS — M19.041 PRIMARY OSTEOARTHRITIS OF BOTH HANDS: ICD-10-CM

## 2025-08-19 DIAGNOSIS — N18.31 STAGE 3A CHRONIC KIDNEY DISEASE (MULTI): ICD-10-CM

## 2025-08-19 DIAGNOSIS — M19.072 PRIMARY OSTEOARTHRITIS OF BOTH FEET: ICD-10-CM

## 2025-08-19 DIAGNOSIS — M17.0 PRIMARY OSTEOARTHRITIS OF BOTH KNEES: ICD-10-CM

## 2025-08-19 DIAGNOSIS — M19.071 PRIMARY OSTEOARTHRITIS OF BOTH FEET: ICD-10-CM

## 2025-08-19 DIAGNOSIS — M07.60 INFLAMMATORY BOWEL ARTHRITIS: ICD-10-CM

## 2025-08-19 DIAGNOSIS — M19.042 PRIMARY OSTEOARTHRITIS OF BOTH HANDS: ICD-10-CM

## 2025-08-19 DIAGNOSIS — Z79.899 ENCOUNTER FOR LONG-TERM (CURRENT) USE OF HIGH-RISK MEDICATION: ICD-10-CM

## 2025-08-19 DIAGNOSIS — K50.10 CROHN'S DISEASE OF COLON WITHOUT COMPLICATION (MULTI): ICD-10-CM

## 2025-08-25 ENCOUNTER — TELEPHONE (OUTPATIENT)
Dept: GASTROENTEROLOGY | Facility: CLINIC | Age: 60
End: 2025-08-25
Payer: COMMERCIAL

## 2025-08-26 ENCOUNTER — OFFICE VISIT (OUTPATIENT)
Dept: PRIMARY CARE | Facility: CLINIC | Age: 60
End: 2025-08-26
Payer: COMMERCIAL

## 2025-08-26 ENCOUNTER — APPOINTMENT (OUTPATIENT)
Dept: GASTROENTEROLOGY | Facility: CLINIC | Age: 60
End: 2025-08-26
Payer: COMMERCIAL

## 2025-08-26 VITALS
OXYGEN SATURATION: 95 % | RESPIRATION RATE: 16 BRPM | DIASTOLIC BLOOD PRESSURE: 92 MMHG | HEART RATE: 57 BPM | TEMPERATURE: 97.7 F | SYSTOLIC BLOOD PRESSURE: 164 MMHG | HEIGHT: 71 IN | WEIGHT: 284 LBS | BODY MASS INDEX: 39.76 KG/M2

## 2025-08-26 VITALS
SYSTOLIC BLOOD PRESSURE: 145 MMHG | HEIGHT: 71 IN | DIASTOLIC BLOOD PRESSURE: 79 MMHG | WEIGHT: 286 LBS | BODY MASS INDEX: 40.04 KG/M2 | HEART RATE: 67 BPM

## 2025-08-26 DIAGNOSIS — M79.10 MYALGIA: ICD-10-CM

## 2025-08-26 DIAGNOSIS — G89.29 CHRONIC RIGHT SHOULDER PAIN: ICD-10-CM

## 2025-08-26 DIAGNOSIS — S51.001A ELBOW WOUND, RIGHT, INITIAL ENCOUNTER: ICD-10-CM

## 2025-08-26 DIAGNOSIS — I10 PRIMARY HYPERTENSION: Primary | ICD-10-CM

## 2025-08-26 DIAGNOSIS — M25.50 ARTHRALGIA, UNSPECIFIED JOINT: ICD-10-CM

## 2025-08-26 DIAGNOSIS — E55.9 VITAMIN D DEFICIENCY: ICD-10-CM

## 2025-08-26 DIAGNOSIS — E53.8 VITAMIN B12 DEFICIENCY: ICD-10-CM

## 2025-08-26 DIAGNOSIS — E78.2 MIXED HYPERLIPIDEMIA: ICD-10-CM

## 2025-08-26 DIAGNOSIS — N18.31 STAGE 3A CHRONIC KIDNEY DISEASE (MULTI): ICD-10-CM

## 2025-08-26 DIAGNOSIS — M25.511 CHRONIC RIGHT SHOULDER PAIN: ICD-10-CM

## 2025-08-26 DIAGNOSIS — Z13.29 THYROID DISORDER SCREENING: ICD-10-CM

## 2025-08-26 DIAGNOSIS — K50.119 CROHN'S DISEASE OF COLON WITH COMPLICATION (MULTI): Primary | ICD-10-CM

## 2025-08-26 DIAGNOSIS — M79.5 FOREIGN BODY (FB) IN SOFT TISSUE: ICD-10-CM

## 2025-08-26 DIAGNOSIS — F17.210 CIGARETTE NICOTINE DEPENDENCE WITHOUT COMPLICATION: ICD-10-CM

## 2025-08-26 PROCEDURE — 3077F SYST BP >= 140 MM HG: CPT | Performed by: INTERNAL MEDICINE

## 2025-08-26 PROCEDURE — 99214 OFFICE O/P EST MOD 30 MIN: CPT | Performed by: INTERNAL MEDICINE

## 2025-08-26 PROCEDURE — 3080F DIAST BP >= 90 MM HG: CPT | Performed by: INTERNAL MEDICINE

## 2025-08-26 PROCEDURE — 3078F DIAST BP <80 MM HG: CPT | Performed by: INTERNAL MEDICINE

## 2025-08-26 PROCEDURE — 3008F BODY MASS INDEX DOCD: CPT | Performed by: INTERNAL MEDICINE

## 2025-08-26 PROCEDURE — G0439 PPPS, SUBSEQ VISIT: HCPCS | Performed by: INTERNAL MEDICINE

## 2025-08-26 RX ORDER — DULOXETIN HYDROCHLORIDE 60 MG/1
60 CAPSULE, DELAYED RELEASE ORAL DAILY
Qty: 30 CAPSULE | Refills: 5 | Status: SHIPPED | OUTPATIENT
Start: 2025-08-26 | End: 2026-02-22

## 2025-08-26 RX ORDER — DEXTROMETHORPHAN HYDROBROMIDE, GUAIFENESIN 5; 100 MG/5ML; MG/5ML
650 LIQUID ORAL EVERY 8 HOURS PRN
COMMUNITY

## 2025-08-26 RX ORDER — METOPROLOL SUCCINATE 100 MG/1
100 TABLET, EXTENDED RELEASE ORAL DAILY
Qty: 90 TABLET | Refills: 3 | Status: SHIPPED | OUTPATIENT
Start: 2025-08-26 | End: 2026-08-26

## 2025-08-26 RX ORDER — VALSARTAN 320 MG/1
320 TABLET ORAL DAILY
Qty: 90 TABLET | Refills: 3 | Status: SHIPPED | OUTPATIENT
Start: 2025-08-26 | End: 2026-08-26

## 2025-08-26 ASSESSMENT — ENCOUNTER SYMPTOMS
DIARRHEA: 0
ABDOMINAL PAIN: 0
WOUND: 1
DIZZINESS: 0
CONFUSION: 0
SHORTNESS OF BREATH: 0
ARTHRALGIAS: 1
UNEXPECTED WEIGHT CHANGE: 0
COUGH: 0
VOMITING: 0
VOMITING: 0
CONSTIPATION: 0
DYSURIA: 0
FEVER: 0
NAUSEA: 0
BLOOD IN STOOL: 0
PALPITATIONS: 0
SHORTNESS OF BREATH: 0
SORE THROAT: 0
LIGHT-HEADEDNESS: 0
DIARRHEA: 0
CHILLS: 0
BLOOD IN STOOL: 0
NAUSEA: 0
AGITATION: 0
LIGHT-HEADEDNESS: 0

## 2025-08-26 ASSESSMENT — PATIENT HEALTH QUESTIONNAIRE - PHQ9
SUM OF ALL RESPONSES TO PHQ9 QUESTIONS 1 AND 2: 0
2. FEELING DOWN, DEPRESSED OR HOPELESS: NOT AT ALL
1. LITTLE INTEREST OR PLEASURE IN DOING THINGS: NOT AT ALL

## 2025-08-27 ENCOUNTER — HOSPITAL ENCOUNTER (OUTPATIENT)
Dept: RADIOLOGY | Facility: HOSPITAL | Age: 60
Discharge: HOME | End: 2025-08-27
Payer: COMMERCIAL

## 2025-08-27 DIAGNOSIS — S51.001A ELBOW WOUND, RIGHT, INITIAL ENCOUNTER: ICD-10-CM

## 2025-08-27 DIAGNOSIS — M79.5 FOREIGN BODY (FB) IN SOFT TISSUE: ICD-10-CM

## 2025-08-27 DIAGNOSIS — G89.29 CHRONIC RIGHT SHOULDER PAIN: ICD-10-CM

## 2025-08-27 DIAGNOSIS — M25.511 CHRONIC RIGHT SHOULDER PAIN: ICD-10-CM

## 2025-08-27 PROCEDURE — 73080 X-RAY EXAM OF ELBOW: CPT | Mod: RT

## 2025-08-27 PROCEDURE — 73080 X-RAY EXAM OF ELBOW: CPT | Mod: RIGHT SIDE | Performed by: RADIOLOGY

## 2025-08-27 PROCEDURE — 73030 X-RAY EXAM OF SHOULDER: CPT | Mod: RT

## 2025-08-27 PROCEDURE — 73030 X-RAY EXAM OF SHOULDER: CPT | Mod: RIGHT SIDE | Performed by: RADIOLOGY

## 2025-12-01 ENCOUNTER — APPOINTMENT (OUTPATIENT)
Dept: GASTROENTEROLOGY | Facility: CLINIC | Age: 60
End: 2025-12-01
Payer: COMMERCIAL

## (undated) DEVICE — BANDAGE, ESMARK, 4 IN X 12 FT, LF

## (undated) DEVICE — SOLUTION, IRRIGATION, STERILE WATER, 1000 ML, POUR BOTTLE

## (undated) DEVICE — SUTURE, ETHILON, 4-0, BLK, MONO, PS-2 18

## (undated) DEVICE — SOLUTION, IRRIGATION, SODIUM CHLORIDE 0.9%, 1000 ML, POUR BOTTLE

## (undated) DEVICE — TOWEL PACK, STERILE, 4/PACK, BLUE

## (undated) DEVICE — BANDAGE, GAUZE, 6 PLY, KERLIX, 4.5 IN X 4.1 YD, AMD, STERILE

## (undated) DEVICE — Device

## (undated) DEVICE — DRESSING, GAUZE, PETROLATUM, PATCH, XEROFORM, 1 X 8 IN, STERILE

## (undated) DEVICE — APPLICATOR, CHLORAPREP, W/ORANGE TINT, 26ML

## (undated) DEVICE — SUTURE, MONOCRYL, 3-0, 18 IN, PS2, UNDYED